# Patient Record
Sex: MALE | Race: WHITE | NOT HISPANIC OR LATINO | URBAN - METROPOLITAN AREA
[De-identification: names, ages, dates, MRNs, and addresses within clinical notes are randomized per-mention and may not be internally consistent; named-entity substitution may affect disease eponyms.]

---

## 2023-02-06 ENCOUNTER — INPATIENT (INPATIENT)
Facility: HOSPITAL | Age: 73
LOS: 2 days | Discharge: HOME | DRG: 299 | End: 2023-02-09
Attending: GENERAL PRACTICE | Admitting: STUDENT IN AN ORGANIZED HEALTH CARE EDUCATION/TRAINING PROGRAM
Payer: MEDICARE

## 2023-02-06 VITALS
SYSTOLIC BLOOD PRESSURE: 148 MMHG | OXYGEN SATURATION: 98 % | HEART RATE: 60 BPM | DIASTOLIC BLOOD PRESSURE: 84 MMHG | TEMPERATURE: 97 F | WEIGHT: 179.9 LBS | RESPIRATION RATE: 16 BRPM

## 2023-02-06 DIAGNOSIS — K08.409 PARTIAL LOSS OF TEETH, UNSPECIFIED CAUSE, UNSPECIFIED CLASS: Chronic | ICD-10-CM

## 2023-02-06 DIAGNOSIS — I26.99 OTHER PULMONARY EMBOLISM WITHOUT ACUTE COR PULMONALE: ICD-10-CM

## 2023-02-06 DIAGNOSIS — Z29.9 ENCOUNTER FOR PROPHYLACTIC MEASURES, UNSPECIFIED: ICD-10-CM

## 2023-02-06 DIAGNOSIS — I82.409 ACUTE EMBOLISM AND THROMBOSIS OF UNSPECIFIED DEEP VEINS OF UNSPECIFIED LOWER EXTREMITY: ICD-10-CM

## 2023-02-06 DIAGNOSIS — K21.9 GASTRO-ESOPHAGEAL REFLUX DISEASE WITHOUT ESOPHAGITIS: ICD-10-CM

## 2023-02-06 DIAGNOSIS — I80.229 PHLEBITIS AND THROMBOPHLEBITIS OF UNSPECIFIED POPLITEAL VEIN: ICD-10-CM

## 2023-02-06 LAB
ALBUMIN SERPL ELPH-MCNC: 3.2 G/DL — LOW (ref 3.3–5)
ALP SERPL-CCNC: 105 U/L — SIGNIFICANT CHANGE UP (ref 40–120)
ALT FLD-CCNC: 35 U/L — SIGNIFICANT CHANGE UP (ref 12–78)
ANION GAP SERPL CALC-SCNC: 6 MMOL/L — SIGNIFICANT CHANGE UP (ref 5–17)
APTT BLD: 32.2 SEC — SIGNIFICANT CHANGE UP (ref 27.5–35.5)
AST SERPL-CCNC: 31 U/L — SIGNIFICANT CHANGE UP (ref 15–37)
BASOPHILS # BLD AUTO: 0.05 K/UL — SIGNIFICANT CHANGE UP (ref 0–0.2)
BASOPHILS NFR BLD AUTO: 1 % — SIGNIFICANT CHANGE UP (ref 0–2)
BILIRUB SERPL-MCNC: 0.6 MG/DL — SIGNIFICANT CHANGE UP (ref 0.2–1.2)
BUN SERPL-MCNC: 15 MG/DL — SIGNIFICANT CHANGE UP (ref 7–23)
CALCIUM SERPL-MCNC: 8.7 MG/DL — SIGNIFICANT CHANGE UP (ref 8.5–10.1)
CHLORIDE SERPL-SCNC: 109 MMOL/L — HIGH (ref 96–108)
CO2 SERPL-SCNC: 26 MMOL/L — SIGNIFICANT CHANGE UP (ref 22–31)
CREAT SERPL-MCNC: 0.92 MG/DL — SIGNIFICANT CHANGE UP (ref 0.5–1.3)
D DIMER BLD IA.RAPID-MCNC: 3234 NG/ML DDU — HIGH
EGFR: 87 ML/MIN/1.73M2 — SIGNIFICANT CHANGE UP
EOSINOPHIL # BLD AUTO: 0.13 K/UL — SIGNIFICANT CHANGE UP (ref 0–0.5)
EOSINOPHIL NFR BLD AUTO: 2.5 % — SIGNIFICANT CHANGE UP (ref 0–6)
FLUAV AG NPH QL: SIGNIFICANT CHANGE UP
FLUBV AG NPH QL: SIGNIFICANT CHANGE UP
GLUCOSE SERPL-MCNC: 87 MG/DL — SIGNIFICANT CHANGE UP (ref 70–99)
HCT VFR BLD CALC: 40.6 % — SIGNIFICANT CHANGE UP (ref 39–50)
HGB BLD-MCNC: 13.4 G/DL — SIGNIFICANT CHANGE UP (ref 13–17)
IMM GRANULOCYTES NFR BLD AUTO: 0 % — SIGNIFICANT CHANGE UP (ref 0–0.9)
INR BLD: 1.03 RATIO — SIGNIFICANT CHANGE UP (ref 0.88–1.16)
LYMPHOCYTES # BLD AUTO: 1.24 K/UL — SIGNIFICANT CHANGE UP (ref 1–3.3)
LYMPHOCYTES # BLD AUTO: 24 % — SIGNIFICANT CHANGE UP (ref 13–44)
MCHC RBC-ENTMCNC: 31.1 PG — SIGNIFICANT CHANGE UP (ref 27–34)
MCHC RBC-ENTMCNC: 33 GM/DL — SIGNIFICANT CHANGE UP (ref 32–36)
MCV RBC AUTO: 94.2 FL — SIGNIFICANT CHANGE UP (ref 80–100)
MONOCYTES # BLD AUTO: 0.39 K/UL — SIGNIFICANT CHANGE UP (ref 0–0.9)
MONOCYTES NFR BLD AUTO: 7.5 % — SIGNIFICANT CHANGE UP (ref 2–14)
NEUTROPHILS # BLD AUTO: 3.36 K/UL — SIGNIFICANT CHANGE UP (ref 1.8–7.4)
NEUTROPHILS NFR BLD AUTO: 65 % — SIGNIFICANT CHANGE UP (ref 43–77)
NRBC # BLD: 0 /100 WBCS — SIGNIFICANT CHANGE UP (ref 0–0)
NT-PROBNP SERPL-SCNC: 186 PG/ML — HIGH (ref 0–125)
PLATELET # BLD AUTO: 232 K/UL — SIGNIFICANT CHANGE UP (ref 150–400)
POTASSIUM SERPL-MCNC: 4.3 MMOL/L — SIGNIFICANT CHANGE UP (ref 3.5–5.3)
POTASSIUM SERPL-SCNC: 4.3 MMOL/L — SIGNIFICANT CHANGE UP (ref 3.5–5.3)
PROT SERPL-MCNC: 6.5 G/DL — SIGNIFICANT CHANGE UP (ref 6–8.3)
PROTHROM AB SERPL-ACNC: 12 SEC — SIGNIFICANT CHANGE UP (ref 10.5–13.4)
RBC # BLD: 4.31 M/UL — SIGNIFICANT CHANGE UP (ref 4.2–5.8)
RBC # FLD: 11.9 % — SIGNIFICANT CHANGE UP (ref 10.3–14.5)
RSV RNA NPH QL NAA+NON-PROBE: SIGNIFICANT CHANGE UP
SARS-COV-2 RNA SPEC QL NAA+PROBE: SIGNIFICANT CHANGE UP
SODIUM SERPL-SCNC: 141 MMOL/L — SIGNIFICANT CHANGE UP (ref 135–145)
TROPONIN I, HIGH SENSITIVITY RESULT: 28.8 NG/L — SIGNIFICANT CHANGE UP
WBC # BLD: 5.17 K/UL — SIGNIFICANT CHANGE UP (ref 3.8–10.5)
WBC # FLD AUTO: 5.17 K/UL — SIGNIFICANT CHANGE UP (ref 3.8–10.5)

## 2023-02-06 PROCEDURE — 93010 ELECTROCARDIOGRAM REPORT: CPT

## 2023-02-06 PROCEDURE — 99285 EMERGENCY DEPT VISIT HI MDM: CPT

## 2023-02-06 PROCEDURE — 99222 1ST HOSP IP/OBS MODERATE 55: CPT | Mod: GC

## 2023-02-06 PROCEDURE — 71275 CT ANGIOGRAPHY CHEST: CPT | Mod: 26,MA

## 2023-02-06 PROCEDURE — 93971 EXTREMITY STUDY: CPT | Mod: 26,LT

## 2023-02-06 RX ORDER — FAMOTIDINE 10 MG/ML
1 INJECTION INTRAVENOUS
Qty: 0 | Refills: 0 | DISCHARGE

## 2023-02-06 RX ORDER — ONDANSETRON 8 MG/1
4 TABLET, FILM COATED ORAL EVERY 8 HOURS
Refills: 0 | Status: DISCONTINUED | OUTPATIENT
Start: 2023-02-06 | End: 2023-02-09

## 2023-02-06 RX ORDER — ACETAMINOPHEN 500 MG
650 TABLET ORAL EVERY 6 HOURS
Refills: 0 | Status: DISCONTINUED | OUTPATIENT
Start: 2023-02-06 | End: 2023-02-09

## 2023-02-06 RX ORDER — HEPARIN SODIUM 5000 [USP'U]/ML
3000 INJECTION INTRAVENOUS; SUBCUTANEOUS EVERY 6 HOURS
Refills: 0 | Status: DISCONTINUED | OUTPATIENT
Start: 2023-02-06 | End: 2023-02-08

## 2023-02-06 RX ORDER — LANOLIN ALCOHOL/MO/W.PET/CERES
3 CREAM (GRAM) TOPICAL AT BEDTIME
Refills: 0 | Status: DISCONTINUED | OUTPATIENT
Start: 2023-02-06 | End: 2023-02-09

## 2023-02-06 RX ORDER — HEPARIN SODIUM 5000 [USP'U]/ML
INJECTION INTRAVENOUS; SUBCUTANEOUS
Qty: 25000 | Refills: 0 | Status: DISCONTINUED | OUTPATIENT
Start: 2023-02-06 | End: 2023-02-08

## 2023-02-06 RX ORDER — HEPARIN SODIUM 5000 [USP'U]/ML
6500 INJECTION INTRAVENOUS; SUBCUTANEOUS ONCE
Refills: 0 | Status: COMPLETED | OUTPATIENT
Start: 2023-02-06 | End: 2023-02-06

## 2023-02-06 RX ORDER — HEPARIN SODIUM 5000 [USP'U]/ML
6500 INJECTION INTRAVENOUS; SUBCUTANEOUS EVERY 6 HOURS
Refills: 0 | Status: DISCONTINUED | OUTPATIENT
Start: 2023-02-06 | End: 2023-02-08

## 2023-02-06 RX ADMIN — HEPARIN SODIUM 1500 UNIT(S)/HR: 5000 INJECTION INTRAVENOUS; SUBCUTANEOUS at 20:35

## 2023-02-06 RX ADMIN — HEPARIN SODIUM 1500 UNIT(S)/HR: 5000 INJECTION INTRAVENOUS; SUBCUTANEOUS at 23:32

## 2023-02-06 RX ADMIN — HEPARIN SODIUM 6500 UNIT(S): 5000 INJECTION INTRAVENOUS; SUBCUTANEOUS at 20:36

## 2023-02-06 NOTE — ED PROVIDER NOTE - PHYSICAL EXAMINATION
Constitutional: Awake, Alert, non-toxic. NAD. Well appearing, well nourished.   HEAD: Normocephalic, atraumatic.   EYES: EOM intact, conjunctiva and sclera are clear bilaterally.   ENT: No rhinorrhea, patent, mucous membranes pink/moist, no drooling or stridor.   NECK: Supple, non-tender  CARDIOVASCULAR: Normal S1, S2; regular rate and rhythm.  RESPIRATORY: Normal respiratory effort; breath sounds CTAB, no wheezes, rhonchi, or rales. Speaking in full sentences. No accessory muscle use.   ABDOMEN: Soft; non-tender, non-distended.   EXTREMITIES: Full passive and active ROM in all extremities; non-tender to palpation; distal pulses palpable and symmetric, (+) mild LLE edema, negative Gabby sign.   SKIN: Warm, dry; good skin turgor, no apparent lesions or rashes, no ecchymosis, brisk capillary refill.  NEURO: A&O x3. Sensory and motor functions are grossly intact. Speech is normal. Appearance and judgement seem appropriate for gender and age.

## 2023-02-06 NOTE — H&P ADULT - HISTORY OF PRESENT ILLNESS
73 yo M presents with LLE ankle swelling x 1 week. Patient noted about 7 days ago while taking a shower, he noted ankle swelling. He called his PCP who recommended to go to an urgent care. The swelling had not progressed. He did not some LLE calf tightness but no swelling or pain. Several days ago he was using a stationary bike and noted SOB at the end of his workout, which is abnormal for him. He was seen by urgent care this morning who recommended LLE ultrasound to r/o clot. As he was driving to [facility], he received a call from his PCP who recommended he go to the ER instead. He otherwise was feeling is normal state of health. Has no prior history of clots, no family history of coagulation disorders, no recent travel.   Patient is an avid runner, and active, previously ran marathons.     ER Course:   Vitals: 148/84 HR 60 RR 16 98% RA   Labs: D dimer: 3234  troponin negative   Imaging:   CTA: Bilateral multifocal pulmonary thromboemboli, lobar and   subsegmental/subsegmental branches. Suspect mild acute right heart strain.  LLE Doppler Ultrasound: Acute left leg deep vein thrombosis involving the distal femoral vein,   popliteal vein and posterior tibial vein. Acute deep venous thrombosis: above and below the knee.  ECG:   Started on heparin gtt    73 yo M presents with LLE ankle swelling x 1 week. Patient noted about 7 days ago while taking a shower, he noted ankle swelling. He called his PCP who recommended to go to an urgent care. The swelling had not progressed. He did not some LLE calf tightness but no swelling or pain. Several days ago he was using a stationary bike and noted SOB at the end of his workout, which is abnormal for him. He was seen by urgent care this morning who recommended LLE ultrasound to r/o clot. As he was driving to [facility], he received a call from his PCP who recommended he go to the ER instead. He otherwise was feeling is normal state of health. Has no prior history of clots, no family history of coagulation disorders, no recent travel.   Patient is an avid runner, and active, previously ran marathons.     ER Course:   Vitals: 148/84 HR 60 RR 16 98% RA   Labs: D dimer: 3234  troponin negative   Imaging:   CTA: Bilateral multifocal pulmonary thromboemboli, lobar and   subsegmental/subsegmental branches. Suspect mild acute right heart strain.  LLE Doppler Ultrasound: Acute left leg deep vein thrombosis involving the distal femoral vein,   popliteal vein and posterior tibial vein. Acute deep venous thrombosis: above and below the knee.  ECG: sinus bradycardia @ 51 bpm   Started on heparin gtt

## 2023-02-06 NOTE — H&P ADULT - PROBLEM SELECTOR PLAN 2
presenting with LLE swelling, found to have above and below the knee DVTs   - no known risk factors; active life style, no a fib, no fhx coagulopathies, BMI wnl; likely unprovoked  - hep gtt  - will likely need hematology work up for coagulopathies, discussed with patient

## 2023-02-06 NOTE — ED ADULT NURSE NOTE - OBJECTIVE STATEMENT
Received pt in bed alert and oriented x4.  C/O left ankle and foot swelling x a couple of weeks. Pt also reports minor swelling to calf.  Pt reports that he noticed swelling, but denies any pain at this time.  Pt denies fevers, sob, chest pain.  Pt called PCP who sent pt to er for eval and rule out DVT.

## 2023-02-06 NOTE — H&P ADULT - NSHPPHYSICALEXAM_GEN_ALL_CORE
T(C): 36.2 (02-06-23 @ 15:04), Max: 36.2 (02-06-23 @ 15:04)  HR: 60 (02-06-23 @ 15:04) (60 - 60)  BP: 148/84 (02-06-23 @ 15:04) (148/84 - 148/84)  RR: 16 (02-06-23 @ 15:04) (16 - 16)  SpO2: 98% (02-06-23 @ 15:04) (98% - 98%)    GENERAL: patient appears well, no acute distress, appropriate, pleasant  EYES: sclera clear, no exudates  ENMT: oropharynx clear without erythema, no exudates, moist mucous membranes  NECK: supple, soft, no thyromegaly noted  LUNGS: good air entry bilaterally, clear to auscultation, symmetric breath sounds, no wheezing or rhonchi appreciated  HEART: soft S1/S2, regular rate and rhythm, no murmurs noted, no lower extremity edema  GASTROINTESTINAL: abdomen is soft, nontender, nondistended, normoactive bowel sounds, no palpable masses  INTEGUMENT: good skin turgor, no lesions noted  MUSCULOSKELETAL: + nonpitting left ankle swelling, no calf tenderness   NEUROLOGIC: awake, alert, oriented x3, good muscle tone in 4 extremities, no obvious sensory deficits  PSYCHIATRIC: mood is good, affect is congruent, linear and logical thought process  HEME/LYMPH: no palpable supraclavicular nodules, no obvious ecchymosis or petechiae

## 2023-02-06 NOTE — H&P ADULT - NSHPREVIEWOFSYSTEMS_GEN_ALL_CORE
Constitutional: denies fever, chills, sweating  HEENT: denies headache, dizziness, or lightheadedness  Respiratory: + SOB, denies cough, or wheezing  Cardiovascular: denies CP, palpitations  Gastrointestinal: denies nausea, vomiting, diarrhea, constipation, abdominal pain, or bloody stools  Genitourinary: denies painful urination, increased frequency, urgency, or bloody urine  Skin/Breast: denies rashes or itching  Musculoskeletal: + LLE swelling. denies muscle aches, joint swelling, or muscle weakness  Neurologic: denies loss of sensation, numbness, or tingling  ROS negative except as noted above

## 2023-02-06 NOTE — H&P ADULT - PROBLEM SELECTOR PLAN 1
CTA revealing bilateral multifocal pulmonary emboli  - likely originated from LLE DVT   - start hep gtt  - evident of right heart strain --> check TTE   - patient currently hemodynamically stable   - h/h wnl, continue to monitor for bleeding  - cards consult CTA revealing bilateral multifocal pulmonary emboli  - likely originated from LLE DVT   - start hep gtt  - evident of right heart strain --> check TTE   - patient currently hemodynamically stable   - h/h wnl, continue to monitor for bleeding  - cards consult [facility], heme consult, [facility] CTA revealing bilateral multifocal pulmonary emboli  - likely originated from LLE DVT   - start hep gtt  - evident of right heart strain --> check TTE   - ekg sinus vika   - patient currently hemodynamically stable   - h/h wnl, continue to monitor for bleeding  - cards consult [facility], heme consult, [facility]

## 2023-02-06 NOTE — ED PROVIDER NOTE - CLINICAL SUMMARY MEDICAL DECISION MAKING FREE TEXT BOX
72-year-old male with no reported past medical history presents today complaining of left lower extremity swelling.  Patient has been experiencing swelling for a week and went to the urgent care today and was given rx for ultrasound to rule out a blood clot.  Patient initially denied shortness of breath, but then reported that he has been noticing with his workouts such as running and bicycling over the last week, he has felt unusually more tired/fatigued.  Patient denies chest pain, hemoptysis, history of blood clot, recent travel, recent surgery, calf pain, blood thinner use, palpitations, syncope, or any other complaints.  denies injury to leg or ankle or pain, denies chest pain or sob  well appearing, slight swelling noted to left ankle, will ro dvt

## 2023-02-06 NOTE — H&P ADULT - ATTENDING COMMENTS
71 yo M admitted for DVT and PE.  start hep drip. heme, cards consult. admit to telemetry. CT with mild acute R heart strain but currently hemodynamically stable. check TTE.

## 2023-02-06 NOTE — ED PROVIDER NOTE - OBJECTIVE STATEMENT
72-year-old male with no reported past medical history presents today complaining of left lower extremity swelling.  Patient has been experiencing swelling for a week and went to the urgent care today in which he was advised to get an ultrasound to rule out a blood clot.  Patient initially denied shortness of breath, but then reported that he has been noticing with his workouts such as running and bicycling over the last week, he has felt unusually more tired/fatigued.  Patient denies chest pain, hemoptysis, history of blood clot, recent travel, recent surgery, calf pain, blood thinner use, palpitations, syncope, or any other complaints.

## 2023-02-06 NOTE — H&P ADULT - NSHPSOCIALHISTORY_GEN_ALL_CORE
lives alone  adls independent  ambulates independent  drugs: no  alcohol: social  smoking: never   covid moderna x x2 no boosters

## 2023-02-07 LAB
ALBUMIN SERPL ELPH-MCNC: 3 G/DL — LOW (ref 3.3–5)
ALP SERPL-CCNC: 91 U/L — SIGNIFICANT CHANGE UP (ref 40–120)
ALT FLD-CCNC: 31 U/L — SIGNIFICANT CHANGE UP (ref 12–78)
ANION GAP SERPL CALC-SCNC: 6 MMOL/L — SIGNIFICANT CHANGE UP (ref 5–17)
APTT BLD: 134.9 SEC — CRITICAL HIGH (ref 27.5–35.5)
APTT BLD: 70.7 SEC — HIGH (ref 27.5–35.5)
APTT BLD: 73.4 SEC — HIGH (ref 27.5–35.5)
AST SERPL-CCNC: 28 U/L — SIGNIFICANT CHANGE UP (ref 15–37)
BILIRUB SERPL-MCNC: 0.6 MG/DL — SIGNIFICANT CHANGE UP (ref 0.2–1.2)
BUN SERPL-MCNC: 15 MG/DL — SIGNIFICANT CHANGE UP (ref 7–23)
CALCIUM SERPL-MCNC: 8.9 MG/DL — SIGNIFICANT CHANGE UP (ref 8.5–10.1)
CHLORIDE SERPL-SCNC: 108 MMOL/L — SIGNIFICANT CHANGE UP (ref 96–108)
CO2 SERPL-SCNC: 28 MMOL/L — SIGNIFICANT CHANGE UP (ref 22–31)
CREAT SERPL-MCNC: 0.9 MG/DL — SIGNIFICANT CHANGE UP (ref 0.5–1.3)
EGFR: 90 ML/MIN/1.73M2 — SIGNIFICANT CHANGE UP
GLUCOSE SERPL-MCNC: 89 MG/DL — SIGNIFICANT CHANGE UP (ref 70–99)
HCT VFR BLD CALC: 38.1 % — LOW (ref 39–50)
HCV AB S/CO SERPL IA: 0.05 S/CO — SIGNIFICANT CHANGE UP (ref 0–0.99)
HCV AB SERPL-IMP: SIGNIFICANT CHANGE UP
HGB BLD-MCNC: 13 G/DL — SIGNIFICANT CHANGE UP (ref 13–17)
MCHC RBC-ENTMCNC: 31.6 PG — SIGNIFICANT CHANGE UP (ref 27–34)
MCHC RBC-ENTMCNC: 34.1 GM/DL — SIGNIFICANT CHANGE UP (ref 32–36)
MCV RBC AUTO: 92.7 FL — SIGNIFICANT CHANGE UP (ref 80–100)
NRBC # BLD: 0 /100 WBCS — SIGNIFICANT CHANGE UP (ref 0–0)
PLATELET # BLD AUTO: 217 K/UL — SIGNIFICANT CHANGE UP (ref 150–400)
POTASSIUM SERPL-MCNC: 4.2 MMOL/L — SIGNIFICANT CHANGE UP (ref 3.5–5.3)
POTASSIUM SERPL-SCNC: 4.2 MMOL/L — SIGNIFICANT CHANGE UP (ref 3.5–5.3)
PROT SERPL-MCNC: 6.1 G/DL — SIGNIFICANT CHANGE UP (ref 6–8.3)
RBC # BLD: 4.11 M/UL — LOW (ref 4.2–5.8)
RBC # FLD: 11.9 % — SIGNIFICANT CHANGE UP (ref 10.3–14.5)
SODIUM SERPL-SCNC: 142 MMOL/L — SIGNIFICANT CHANGE UP (ref 135–145)
WBC # BLD: 4.65 K/UL — SIGNIFICANT CHANGE UP (ref 3.8–10.5)
WBC # FLD AUTO: 4.65 K/UL — SIGNIFICANT CHANGE UP (ref 3.8–10.5)

## 2023-02-07 PROCEDURE — 99222 1ST HOSP IP/OBS MODERATE 55: CPT

## 2023-02-07 PROCEDURE — 99233 SBSQ HOSP IP/OBS HIGH 50: CPT | Mod: GC

## 2023-02-07 PROCEDURE — 93306 TTE W/DOPPLER COMPLETE: CPT | Mod: 26

## 2023-02-07 PROCEDURE — 74177 CT ABD & PELVIS W/CONTRAST: CPT | Mod: 26

## 2023-02-07 RX ADMIN — HEPARIN SODIUM 1200 UNIT(S)/HR: 5000 INJECTION INTRAVENOUS; SUBCUTANEOUS at 19:04

## 2023-02-07 RX ADMIN — HEPARIN SODIUM 1200 UNIT(S)/HR: 5000 INJECTION INTRAVENOUS; SUBCUTANEOUS at 12:25

## 2023-02-07 RX ADMIN — HEPARIN SODIUM 1200 UNIT(S)/HR: 5000 INJECTION INTRAVENOUS; SUBCUTANEOUS at 07:03

## 2023-02-07 RX ADMIN — HEPARIN SODIUM 1200 UNIT(S)/HR: 5000 INJECTION INTRAVENOUS; SUBCUTANEOUS at 19:33

## 2023-02-07 RX ADMIN — HEPARIN SODIUM 1200 UNIT(S)/HR: 5000 INJECTION INTRAVENOUS; SUBCUTANEOUS at 04:58

## 2023-02-07 RX ADMIN — HEPARIN SODIUM 1200 UNIT(S)/HR: 5000 INJECTION INTRAVENOUS; SUBCUTANEOUS at 14:59

## 2023-02-07 RX ADMIN — HEPARIN SODIUM 0 UNIT(S)/HR: 5000 INJECTION INTRAVENOUS; SUBCUTANEOUS at 03:56

## 2023-02-07 NOTE — CARE COORDINATION ASSESSMENT. - ASSESSMENT CONCERNS TO BE ADDRESSED
Patient is aware he will be discharged on a po anticoagulant. Patient stated he will follow up with outpatient providers. Does not need a visiting nurse./no discharge needs identified

## 2023-02-07 NOTE — DISCHARGE NOTE PROVIDER - NSDCMRMEDTOKEN_GEN_ALL_CORE_FT
Pepcid 20 mg oral tablet: 1 tab(s) orally once a day   Eliquis 5 mg oral tablet: Please take 2 tab(s) for a total of 10mg every 12 hours for the first 6 days from discharge  Eliquis 5 mg oral tablet: 1 tab(s) orally every 12 hours   Pepcid 20 mg oral tablet: 1 tab(s) orally once a day

## 2023-02-07 NOTE — CONSULT NOTE ADULT - SUBJECTIVE AND OBJECTIVE BOX
All records reviewed.    HPI:  71 yo man not on prescription meds and has seen PMD recently due to COVID pandemic.   Presented w 4-5 day hx left foot mild edema slow progressed to left calf, not assoc w pain or color change. Also in retrospect MAGANA /less exeercise tolerance x 1wk, seen at Urgent Care told possible DVT, and also instructed by PMD to go to ER    Duplex w left leg DVT distal femoral, pop, post tibial  CTA chest yao multifocal PE, suspect mild right heart strain  Started on Heparin, left foot swelling decreased, no SOB CP    No recent prolonged immobility, not on androgen  No family hx thromboembolism      ER Course:   Vitals: 148/84 HR 60 RR 16 98% RA   Labs: D dimer: 3234  troponin negative   Imaging:   CTA: Bilateral multifocal pulmonary thromboemboli, lobar and   subsegmental/subsegmental branches. Suspect mild acute right heart strain.  LLE Doppler Ultrasound: Acute left leg deep vein thrombosis involving the distal femoral vein,   popliteal vein and posterior tibial vein. Acute deep venous thrombosis: above and below the knee.  ECG: sinus bradycardia @ 51 bpm     PAST MEDICAL & SURGICAL HISTORY:  No pertinent past medical history      [place] teeth extracted          Review of System:  see above  no weight loss fever anorexia sweats diarreha constipation    MEDICATIONS  (STANDING):  heparin  Infusion.  Unit(s)/Hr (15 mL/Hr) IV Continuous <Continuous>    MEDICATIONS  (PRN):  acetaminophen     Tablet .. 650 milliGRAM(s) Oral every 6 hours PRN Temp greater or equal to 38C (100.4F), Mild Pain (1 - 3)  aluminum hydroxide/magnesium hydroxide/simethicone Suspension 30 milliLiter(s) Oral every 4 hours PRN Dyspepsia  heparin   Injectable 6500 Unit(s) IV Push every 6 hours PRN For aPTT less than 40  heparin   Injectable 3000 Unit(s) IV Push every 6 hours PRN For aPTT between 40 - 57  melatonin 3 milliGRAM(s) Oral at bedtime PRN Insomnia  ondansetron Injectable 4 milliGRAM(s) IV Push every 8 hours PRN Nausea and/or Vomiting      Allergies    No Known Allergies    Intolerances        SOCIAL HISTORY:  smokes marijuana  single no children    FAMILY HISTORY:  FH: type 2 diabetes (Mother)        Vital Signs Last 24 Hrs  T(C): 36.8 (07 Feb 2023 05:16), Max: 37 (06 Feb 2023 22:53)  T(F): 98.2 (07 Feb 2023 05:16), Max: 98.6 (06 Feb 2023 22:53)  HR: 46 (07 Feb 2023 05:16) (46 - 60)  BP: 125/75 (07 Feb 2023 05:16) (125/75 - 159/78)  BP(mean): --  RR: 17 (07 Feb 2023 05:16) (14 - 18)  SpO2: 94% (07 Feb 2023 05:16) (94% - 98%)    Parameters below as of 07 Feb 2023 05:16  Patient On (Oxygen Delivery Method): room air        PHYSICAL EXAM:      General:, in no acute distress  Eyes:sclera anicteric, pupils equal and EOMI  ENMT:buccal mucosa moist  Neck:supple, trachea midline  Lungs:clear, no wheeze/rhonchi  Cardiovascular:regular rate and rhythm, S1 S2  Abdomen:soft, nontender, no organomegaly present, bowel sounds normal  Neurological:alert and oriented x3, Cranial Nerves II-XII grossly intact  Skin:no increased ecchymosis/petechiae/purpura  Lymph Nodes:no palpable cervical/supraclavicular lymph nodes enlargements  Extremities:no cyanosis/clubbing, trace edema left ankle        LABS:                        13.0   4.65  )-----------( 217      ( 07 Feb 2023 02:34 )             38.1     02-07 @ 02:34  WBC4.65  RBC4.11 Hgb13.0 Hct38.1  MCV92.7  Cwck134  Auto Neutro-- Band-- Auto Lymph-- Atypical Lymph-- Reactive Lymph-- Auto Mono-- Auto Eos-- Auto Baso--        02-06 @ 16:30  WBC5.17  RBC4.31 Hgb13.4 Hct40.6  MCV94.2  [id number]  Auto Zrhvlx86.0 Band-- Auto Lymph24.0 Atypical Lymph-- Reactive Lymph-- Auto Mono7.5 Auto Eos2.5 Auto Baso1.0          02-07    142  |  108  |  15  ----------------------------<  89  4.2   |  28  |  0.90    Ca    8.9      07 Feb 2023 06:29    TPro  6.1  /  Alb  3.0<L>  /  TBili  0.6  /  DBili  x   /  AST  28  /  ALT  31  /  AlkPhos  91  02-07    02-07 @ 02:34  PT-- INR--  [id number]  02-06 @ 16:30  PT12.0 INR1.03  PTT32.2        PERIPHERAL BLOOD SMEAR REVIEW:      RADIOLOGY & ADDITIONAL STUDIES:  < from: CT Angio Chest PE Protocol w/ IV Cont (02.06.23 @ 19:09) >    ACC: [id number] EXAM:  CT ANGIO CHEST PULM ART WAWIC   ORDERED BY:   SEDRIKC KRISHNA     PROCEDURE DATE:  02/06/2023          INTERPRETATION:  CLINICAL INFORMATION: Shortness of breath. Edema.    COMPARISON: None.    CONTRAST/COMPLICATIONS:  IVContrast: Omnipaque 350  66 cc administered   34 cc discarded  Oral Contrast: NONE  Complications: None reported at time of study completion    PROCEDURE:  CT Angiography of the Chest.  Sagittal and coronal reformats were performed as well as 3D (MIP)   reconstructions.    FINDINGS:    LUNGS AND AIRWAYS: Patent central airways.  Mild dependent atelectasis.   No consolidation or infarct. Azygous lobe anatomical variant.  PLEURA: No pleural effusion.  MEDIASTINUM AND LUCIA: No lymphadenopathy.  VESSELS: Multifocal bilateral pulmonary thromboemboli, right upper lobar   bifurcation, right interlobar pulmonary artery, left upper and lower lobe   lobar bifurcation, and also scattered segmental/subsegmental branches   bilaterally.  HEART: Heart size is normal. Flattened interventricular septum without   bowing. Minimal reflux of contrast into suprahepatic IVC. Right   ventricle/left ventricle ratio 1.2. No pericardial effusion.  CHEST WALL AND LOWER NECK: Within normal limits.  VISUALIZED UPPER ABDOMEN: Within normal limits.  BONES: Degenerative changes.    IMPRESSION:  Bilateral multifocal pulmonary thromboemboli, lobar and   subsegmental/subsegmental branches. Suspect mild acute right heart strain.    Findings were discussed with Dr. SEDRICK KRISHNA [phone]   2/6/2023 7:30 PM by Dr. Oliver with read back confirmation.      < end of copied text >  < from: [facility] Duplex Lower Ext Vein Scan, Left (02.06.23 @ 16:30) >  ACC: [id number] EXAM:  DUPLEX EXT VEINS LOWER LT   ORDERED BY: SEDRICK KRISHNA     PROCEDURE DATE:  02/06/2023          INTERPRETATION:  CLINICAL INFORMATION: Left leg swelling.    COMPARISON: None available.    TECHNIQUE: Duplex sonography of the LEFT LOWER extremity veins with color   and spectral Doppler, with and without compression.    FINDINGS:    The left common femoral vein and proximal and mid femoral veins are   unremarkable.    There is deep vein thrombosis in the distal left femoral vein and   popliteal vein extending to the left posterior tibial vein.    Limited visualization of the calf veins.    IMPRESSION:  Acute left leg deep vein thrombosis involving the distal femoral vein,   popliteal vein and posterior tibial vein.  Acute deep venous thrombosis: above and below the knee.    Findings were discussed with Dr. SEDRICK KRISHNA [phone]   2/6/2023 4:48 PM by Dr. Louis Ramos with read back confirmation.      < end of copied text >   All records reviewed.    HPI:  71 yo man not on prescription meds and has seen PMD recently due to COVID pandemic.   Presented w 4-5 day hx left foot mild edema slow progressed to left calf, not assoc w pain or color change. Also in retrospect MAGANA /less exeercise tolerance x 1wk, seen at Urgent Care told possible DVT, and also instructed by PMD to go to ER    Duplex w left leg DVT distal femoral, pop, post tibial  CTA chest yao multifocal PE, suspect mild right heart strain  Started on Heparin, left foot swelling decreased, no SOB CP    No recent prolonged immobility, not on androgen  No family hx thromboembolism      ER Course:   Vitals: 148/84 HR 60 RR 16 98% RA   Labs: D dimer: 3234  troponin negative   Imaging:   CTA: Bilateral multifocal pulmonary thromboemboli, lobar and   subsegmental/subsegmental branches. Suspect mild acute right heart strain.  LLE Doppler Ultrasound: Acute left leg deep vein thrombosis involving the distal femoral vein,   popliteal vein and posterior tibial vein. Acute deep venous thrombosis: above and below the knee.  ECG: sinus bradycardia @ 51 bpm     PAST MEDICAL & SURGICAL HISTORY:  No pertinent past medical history      [place] teeth extracted          Review of System:  see above  no weight loss fever anorexia sweats diarreha constipation    MEDICATIONS  (STANDING):  heparin  Infusion.  Unit(s)/Hr (15 mL/Hr) IV Continuous <Continuous>    MEDICATIONS  (PRN):  acetaminophen     Tablet .. 650 milliGRAM(s) Oral every 6 hours PRN Temp greater or equal to 38C (100.4F), Mild Pain (1 - 3)  aluminum hydroxide/magnesium hydroxide/simethicone Suspension 30 milliLiter(s) Oral every 4 hours PRN Dyspepsia  heparin   Injectable 6500 Unit(s) IV Push every 6 hours PRN For aPTT less than 40  heparin   Injectable 3000 Unit(s) IV Push every 6 hours PRN For aPTT between 40 - 57  melatonin 3 milliGRAM(s) Oral at bedtime PRN Insomnia  ondansetron Injectable 4 milliGRAM(s) IV Push every 8 hours PRN Nausea and/or Vomiting      Allergies    No Known Allergies    Intolerances        SOCIAL HISTORY:  never tobacco  single no children    FAMILY HISTORY:  FH: type 2 diabetes (Mother)        Vital Signs Last 24 Hrs  T(C): 36.8 (07 Feb 2023 05:16), Max: 37 (06 Feb 2023 22:53)  T(F): 98.2 (07 Feb 2023 05:16), Max: 98.6 (06 Feb 2023 22:53)  HR: 46 (07 Feb 2023 05:16) (46 - 60)  BP: 125/75 (07 Feb 2023 05:16) (125/75 - 159/78)  BP(mean): --  RR: 17 (07 Feb 2023 05:16) (14 - 18)  SpO2: 94% (07 Feb 2023 05:16) (94% - 98%)    Parameters below as of 07 Feb 2023 05:16  Patient On (Oxygen Delivery Method): room air        PHYSICAL EXAM:      General:, in no acute distress  Eyes:sclera anicteric, pupils equal and EOMI  ENMT:buccal mucosa moist  Neck:supple, trachea midline  Lungs:clear, no wheeze/rhonchi  Cardiovascular:regular rate and rhythm, S1 S2  Abdomen:soft, nontender, no organomegaly present, bowel sounds normal  Neurological:alert and oriented x3, Cranial Nerves II-XII grossly intact  Skin:no increased ecchymosis/petechiae/purpura  Lymph Nodes:no palpable cervical/supraclavicular lymph nodes enlargements  Extremities:no cyanosis/clubbing, trace edema left ankle        LABS:                        13.0   4.65  )-----------( 217      ( 07 Feb 2023 02:34 )             38.1     02-07 @ 02:34  WBC4.65  RBC4.11 Hgb13.0 Hct38.1  MCV92.7  [id number]  Auto Neutro-- Band-- Auto Lymph-- Atypical Lymph-- Reactive Lymph-- Auto Mono-- Auto Eos-- Auto Baso--        02-06 @ 16:30  WBC5.17  RBC4.31 Hgb13.4 Hct40.6  MCV94.2  [id number]  Auto Nzgqcf89.0 Band-- Auto Lymph24.0 Atypical Lymph-- Reactive Lymph-- Auto Mono7.5 Auto Eos2.5 Auto Baso1.0          02-07    142  |  108  |  15  ----------------------------<  89  4.2   |  28  |  0.90    Ca    8.9      07 Feb 2023 06:29    TPro  6.1  /  Alb  3.0<L>  /  TBili  0.6  /  DBili  x   /  AST  28  /  ALT  31  /  AlkPhos  91  02-07    02-07 @ 02:34  PT-- INR--  [id number]  02-06 @ 16:30  PT12.0 INR1.03  PTT32.2        PERIPHERAL BLOOD SMEAR REVIEW:      RADIOLOGY & ADDITIONAL STUDIES:  < from: CT Angio Chest PE Protocol w/ IV Cont (02.06.23 @ 19:09) >    ACC: [id number] EXAM:  CT ANGIO CHEST PULM ART WAWIC   ORDERED BY:   SEDRICK KRISHNA     PROCEDURE DATE:  02/06/2023          INTERPRETATION:  CLINICAL INFORMATION: Shortness of breath. Edema.    COMPARISON: None.    CONTRAST/COMPLICATIONS:  IVContrast: Omnipaque 350  66 cc administered   34 cc discarded  Oral Contrast: NONE  Complications: None reported at time of study completion    PROCEDURE:  CT Angiography of the Chest.  Sagittal and coronal reformats were performed as well as 3D (MIP)   reconstructions.    FINDINGS:    LUNGS AND AIRWAYS: Patent central airways.  Mild dependent atelectasis.   No consolidation or infarct. Azygous lobe anatomical variant.  PLEURA: No pleural effusion.  MEDIASTINUM AND LUCIA: No lymphadenopathy.  VESSELS: Multifocal bilateral pulmonary thromboemboli, right upper lobar   bifurcation, right interlobar pulmonary artery, left upper and lower lobe   lobar bifurcation, and also scattered segmental/subsegmental branches   bilaterally.  HEART: Heart size is normal. Flattened interventricular septum without   bowing. Minimal reflux of contrast into suprahepatic IVC. Right   ventricle/left ventricle ratio 1.2. No pericardial effusion.  CHEST WALL AND LOWER NECK: Within normal limits.  VISUALIZED UPPER ABDOMEN: Within normal limits.  BONES: Degenerative changes.    IMPRESSION:  Bilateral multifocal pulmonary thromboemboli, lobar and   subsegmental/subsegmental branches. Suspect mild acute right heart strain.    Findings were discussed with Dr. SEDRICK KRISHNA [phone]   2/6/2023 7:30 PM by Dr. Oliver with read back confirmation.      < end of copied text >  < from: [facility] Duplex Lower Ext Vein Scan, Left (02.06.23 @ 16:30) >  ACC: [id number] EXAM:  DUPLEX EXT VEINS LOWER LT   ORDERED BY: SEDRICK KRISHNA     PROCEDURE DATE:  02/06/2023          INTERPRETATION:  CLINICAL INFORMATION: Left leg swelling.    COMPARISON: None available.    TECHNIQUE: Duplex sonography of the LEFT LOWER extremity veins with color   and spectral Doppler, with and without compression.    FINDINGS:    The left common femoral vein and proximal and mid femoral veins are   unremarkable.    There is deep vein thrombosis in the distal left femoral vein and   popliteal vein extending to the left posterior tibial vein.    Limited visualization of the calf veins.    IMPRESSION:  Acute left leg deep vein thrombosis involving the distal femoral vein,   popliteal vein and posterior tibial vein.  Acute deep venous thrombosis: above and below the knee.    Findings were discussed with Dr. SEDRICK KRISHNA [id number]   2/6/2023 4:48 PM by Dr. Louis Ramos with read back confirmation.      < end of copied text >

## 2023-02-07 NOTE — CARE COORDINATION ASSESSMENT. - NSCAREPROVIDERS_GEN_ALL_CORE_FT
CARE PROVIDERS:  Accepting Physician: Tyrell Brito  Admitting: Tyrell Brito  Attending: Tyrell Brito  Cardiology Technician: Jessenia Vu  Case Management: Cleopatra Rooney  Case Management: Mell Ospina  Consultant: Mag Harmon  Covering Team: Bernabe Yao  Covering Team: Sivan Harry  ED ACP: Etienne Montiel  ED Attending: Linda Dawson ED Nurse: Moses Alonzo  Emergency Medicine: Etienne Montiel  Nurse: Keven Elkins  Nurse: Tabitha Mills  Nurse: Rody Cole  Nurse: Moses Alonzo  Ordered: ADM, User  Override: Viola Pandey  PCA/Nursing Assistant: Nga Monteiro  PCA/Nursing Assistant: Chari Osuna  Primary Team: Johanne Jennings  Primary Team: Arvin Washburn  Primary Team: Francis Dillard  Primary Team: Wei Carl  Primary Team: Tyrell Brito  Primary Team: Tee Mendoza  Registered Dietitian: Sigrid Dahl  Student: Adeladia Dimas

## 2023-02-07 NOTE — CARE COORDINATION ASSESSMENT. - OTHER PERTINENT DISCHARGE PLANNING INFORMATION:
CM met with the patient at the bedside and explained role of CM and transition planning. Patient verbalized understanding. Patient lives alone in a private home. Independent with ADL's/ambulation/driving PTA. Patient denies home care services PTA. Patient was not taking medications PTA. CM provided direct contact information/resource folder and remains available.

## 2023-02-07 NOTE — DISCHARGE NOTE PROVIDER - CARE PROVIDER_API CALL
Mag Harmon)  Medical Oncology  [address], [address]  [place] [address]  Phone: [phone]  Fax: [phone]  Follow Up Time: 1 week    CHRISTEL GERMAIN  [facility] Medicine  [facility],    Phone: ()-  Fax: ()-  Established Patient  Follow Up Time: 1 week   Mag Harmon)  Medical Oncology  [address], [address]  [place] [address]  Phone: [phone]  Fax: [phone]  Follow Up Time: 2 weeks    CHRISTEL GERMAIN  [facility]  [facility],    Phone: ()-  Fax: ()-  Established Patient  Follow Up Time:    Mag Harmon (MD)  Medical Oncology  [address], [address]  [place] [address]  Phone: [phone]  Fax: [phone]  Follow Up Time: 2 weeks    CHRISTEL GERMAIN  [facility] Medicine  [facility] DO CHRISTEL,    Phone: ()-  Fax: ()-  Established Patient  Follow Up Time:     Callum Magallon (DO)  Cardiology; Internal Medicine; Nuclear Cardiology  [facility], [address] [place] [address], [address]  [place] [address]  Phone: [phone]  Fax: [phone]  Follow Up Time: 2 weeks

## 2023-02-07 NOTE — PROGRESS NOTE ADULT - ATTENDING COMMENTS
73 yo M presents with LLE ankle swelling x 1 week admitted for acute bilateral pulmonary emboli.    Patient seen and examined at bedside. Patient states he feels well, denies any chest pain, SOB, abd pain. States swelling in his L ankle also much improved. CTA with B/L PE and concern for mild R heart strain, F/U TTE, Vascular and Cardio consulted. Check CT abd/pelvis venogram. Continue heparin gtt for AC, likely switch to PO eliquis if no vascular intervention planned. Heme/Onc consulted, patient will require lifelong AC.

## 2023-02-07 NOTE — PATIENT PROFILE ADULT - NSPROGENPREVTRANSF_GEN_A_NUR
[Irregular Periods] : irregular periods [FreeTextEntry2] : Tiffany is a 14 year 4 month old female referred by PCP Dr. Smyth due to concern re: abnormal thyroid levels. Mother reports that patient had blood work done at the end of December 2021 due to frequent nausea in the morning times x past 5 month. Mother attributed Tiffany's symptoms to older brother's marijuana usage in the house. \par \par In the past week her symptoms got worse and she started having constant nausea, associated with vomiting 3-4 times and poor PO intake. Patient has taken Pepto Bismol and Dramamine for symptoms with no relief. She denied vision changes, weight loss, severe headaches, abdominal pain, hair loss and dry skin. Patient also reports cold intolerance. Tiffany was seen by PMD 2 day after onset of symptoms and tested for COVID and flu, which were negative. \par \par Patient also reports fatigue. She goes to sleep at 12am and wakes up at 6am. Only gets about 6 hours of sleep, no waking up in the middle of night. She is snoring at night. \par \par Patient reports irregular periods. Onset of periods at 9 years old. LMP 2/1/22, 12/31/21, 12/4/21, 10/15/21, 8/29/21. Patient reports that her periods are painful the first 2 days and last 2 days and it causes her to lose a day of school every month when she has her period due to pain. \par \par Lab work revealed elevated LDL Cholesterol. Patient admits large amount of red meat in her diet likely causing hypercholesterolemia. Also, she eats a lot of bread.  [FreeTextEntry1] : Menarche at 9 years 11 month; LMP 2/1/22 no

## 2023-02-07 NOTE — PATIENT PROFILE ADULT - FALL HARM RISK - HARM RISK INTERVENTIONS

## 2023-02-07 NOTE — DISCHARGE NOTE PROVIDER - PROVIDER TOKENS
PROVIDER:[TOKEN:[[id number]],FOLLOWUP:[1 week]],PROVIDER:[TOKEN:[[id number]],FOLLOWUP:[1 week],ESTABLISHEDPATIENT:[T]] PROVIDER:[TOKEN:[[id number]],FOLLOWUP:[2 weeks]],PROVIDER:[TOKEN:[[id number]],ESTABLISHEDPATIENT:[T]] PROVIDER:[TOKEN:[[id number]],FOLLOWUP:[2 weeks]],PROVIDER:[TOKEN:[[id number]],ESTABLISHEDPATIENT:[T]],PROVIDER:[TOKEN:[[id number]],FOLLOWUP:[2 weeks]]

## 2023-02-07 NOTE — CONSULT NOTE ADULT - ATTENDING COMMENTS
pe with equivocal right heart strain by ct  echo prelim with normal ef and no right heart pathology  ac

## 2023-02-07 NOTE — CARE COORDINATION ASSESSMENT. - NSDCPLANSERVICES_GEN_ALL_CORE
Per MD, pending Echo to determine plan of care.     If stable for transition home, patient stated he will follow up with his outpatient providers./No Anticipated Discharge Needs

## 2023-02-07 NOTE — CONSULT NOTE ADULT - SUBJECTIVE AND OBJECTIVE BOX
Vascular Attending:        HPI:  73 yo M presents with LLE ankle swelling x 1 week. Patient noted about 7 days ago while taking a shower, he noted ankle swelling. He called his PCP who recommended to go to an urgent care. The swelling had not progressed. He did not some LLE calf tightness but no swelling or pain. Several days ago he was using a stationary bike and noted SOB at the end of his workout, which is abnormal for him. He was seen by urgent care this morning who recommended LLE ultrasound to r/o clot. As he was driving to [facility], he received a call from his PCP who recommended he go to the ER instead. He otherwise was feeling is normal state of health. Has no prior history of clots, no family history of coagulation disorders, no recent travel.   Patient is an avid runner, and active, previously ran marathons.   Pt walks/runs 4 miles daily and bikes 1700 miles per year. No h/o DVT/PE, varicose veins or family hx of either      ER Course:   Vitals: 148/84 HR 60 RR 16 98% RA   Labs: D dimer: 3234  troponin negative   Imaging:   CTA: Bilateral multifocal pulmonary thromboemboli, lobar and   subsegmental/subsegmental branches. Suspect mild acute right heart strain.  LLE Doppler Ultrasound: Acute left leg deep vein thrombosis involving the distal femoral vein,   popliteal vein and posterior tibial vein. Acute deep venous thrombosis: above and below the knee.  ECG: sinus bradycardia @ 51 bpm   Started on heparin gtt    (06 Feb 2023 20:32)      PAST MEDICAL & SURGICAL HISTORY:  No pertinent past medical history  [place] teeth extracted          REVIEW OF SYSTEMS-as above, otherwise negative      MEDICATIONS  (STANDING):  heparin  Infusion.  Unit(s)/Hr (15 mL/Hr) IV Continuous <Continuous>    MEDICATIONS  (PRN):  acetaminophen     Tablet .. 650 milliGRAM(s) Oral every 6 hours PRN Temp greater or equal to 38C (100.4F), Mild Pain (1 - 3)  aluminum hydroxide/magnesium hydroxide/simethicone Suspension 30 milliLiter(s) Oral every 4 hours PRN Dyspepsia  heparin   Injectable 6500 Unit(s) IV Push every 6 hours PRN For aPTT less than 40  heparin   Injectable 3000 Unit(s) IV Push every 6 hours PRN For aPTT between 40 - 57  melatonin 3 milliGRAM(s) Oral at bedtime PRN Insomnia  ondansetron Injectable 4 milliGRAM(s) IV Push every 8 hours PRN Nausea and/or Vomiting      Allergies  No Known Allergies    SOCIAL HISTORY: Lives alone. Retired . Never smoker. No ETOH or illicit drug      Vital Signs Last 24 Hrs  T(C): 36.6 (07 Feb 2023 12:26), Max: 37 (06 Feb 2023 22:53)  T(F): 97.9 (07 Feb 2023 12:26), Max: 98.6 (06 Feb 2023 22:53)  HR: 58 (07 Feb 2023 12:26) (46 - 60)  BP: 123/72 (07 Feb 2023 12:26) (123/72 - 159/78)  BP(mean): --  RR: 18 (07 Feb 2023 12:26) (14 - 18)  SpO2: 96% (07 Feb 2023 12:26) (94% - 98%)    Parameters below as of 07 Feb 2023 12:26  Patient On (Oxygen Delivery Method): room air        PHYSICAL EXAM:  Constitutional: WD, WN M in NAD  Eyes: PERRL  ENMT: WNL  Neck: No JVD  Respiratory: CTA  Cardiovascular: normal S1, S2  Gastrointestinal: soft, ND, NT, no pulsatile masses  Extremities: Trace LLE edema without discoloration or skin breakdown; neg Gabby's  Neurological: A&O x3, JULIEN X 4 =  Pulses:   Right:                                                                          Left:  FEM [x ]2+ [ ]1+ [ ]doppler                                             FEM [x ]2+ [ ]1+ [ ]doppler    POP [x ]2+ [ ]1+ [ ]doppler                                             POP [x ]2+ [ ]1+ [ ]doppler    DP [x ]2+ [ ]1+ [ ]doppler                                                DP [x ]2+ [ ]1+ [ ]doppler  PT[x ]2+ [ ]1+ [ ]doppler                                                  PT [x ]2+ [ ]1+ [ ]doppler      LABS:                        13.0   4.65  )-----------( 217      ( 07 Feb 2023 02:34 )             38.1     02-07    142  |  108  |  15  ----------------------------<  89  4.2   |  28  |  0.90    Ca    8.9      07 Feb 2023 06:29    TPro  6.1  /  Alb  3.0<L>  /  TBili  0.6  /  DBili  x   /  AST  28  /  ALT  31  /  AlkPhos  91  02-07    PT/INR - ( 06 Feb 2023 16:30 )   PT: 12.0 sec;   INR: 1.03 ratio    PTT - ( 07 Feb 2023 11:07 )  PTT:70.7 sec      RADIOLOGY & ADDITIONAL STUDIES    < from: VA Duplex Lower Ext Vein Scan, Left (02.06.23 @ 16:30) >  ACC: [id number] EXAM:  DUPLEX EXT VEINS LOWER LT   ORDERED BY: SEDRICK KRISHNA     PROCEDURE DATE:  02/06/2023          INTERPRETATION:  CLINICAL INFORMATION: Left leg swelling.    COMPARISON: None available.    TECHNIQUE: Duplex sonography of the LEFT LOWER extremity veins with color   and spectral Doppler, with and without compression.    FINDINGS:    The left common femoral vein and proximal and mid femoral veins are   unremarkable.    There is deep vein thrombosis in the distal left femoral vein and   popliteal vein extending to the left posterior tibial vein.    Limited visualization of the calf veins.    IMPRESSION:  Acute left leg deep vein thrombosis involving the distal femoral vein,   popliteal vein and posterior tibial vein.  Acute deep venous thrombosis: above and below the knee.    Findings were discussed with Dr. SEDRICK KRISHNA [id number]   2/6/2023 4:48 PM by Dr. Louis Ramos with read back confirmation.      < end of copied text >  < from: CT Angio Chest PE Protocol w/ IV Cont (02.06.23 @ 19:09) >  ACC: [id number] EXAM:  CT ANGIO CHEST PULM ART WAWIC   ORDERED BY:   SEDRICK KRISHNA     PROCEDURE DATE:  02/06/2023          INTERPRETATION:  CLINICAL INFORMATION: Shortness of breath. Edema.    COMPARISON: None.    CONTRAST/COMPLICATIONS:  IVContrast: Omnipaque 350  66 cc administered   34 cc discarded  Oral Contrast: NONE  Complications: None reported at time of study completion    PROCEDURE:  CT Angiography of the Chest.  Sagittal and coronal reformats were performed as well as 3D (MIP)   reconstructions.    FINDINGS:    LUNGS AND AIRWAYS: Patent central airways.  Mild dependent atelectasis.   No consolidation or infarct. Azygous lobe anatomical variant.  PLEURA: No pleural effusion.  MEDIASTINUM AND LUCIA: No lymphadenopathy.  VESSELS: Multifocal bilateral pulmonary thromboemboli, right upper lobar   bifurcation, right interlobar pulmonary artery, left upper and lower lobe   lobar bifurcation, and also scattered segmental/subsegmental branches   bilaterally.  HEART: Heart size is normal. Flattened interventricular septum without   bowing. Minimal reflux of contrast into suprahepatic IVC. Right   ventricle/left ventricle ratio 1.2. No pericardial effusion.  CHEST WALL AND LOWER NECK: Within normal limits.  VISUALIZED UPPER ABDOMEN: Within normal limits.  BONES: Degenerative changes.    IMPRESSION:  Bilateral multifocal pulmonary thromboemboli, lobar and   subsegmental/subsegmental branches. Suspect mild acute right heart strain.    Findings were discussed with Dr. SEDRICK KRISHNA [phone]   2/6/2023 7:30 PM by Dr. Oliver with read back confirmation.    < end of copied text >

## 2023-02-07 NOTE — DISCHARGE NOTE PROVIDER - HOSPITAL COURSE
(FROM ADMISSION H+P)  HPI:  73 yo M presents with LLE ankle swelling x 1 week. Patient noted about 7 days ago while taking a shower, he noted ankle swelling. He called his PCP who recommended to go to an urgent care. The swelling had not progressed. He did not some LLE calf tightness but no swelling or pain. Several days ago he was using a stationary bike and noted SOB at the end of his workout, which is abnormal for him. He was seen by urgent care this morning who recommended LLE ultrasound to r/o clot. As he was driving to [facility], he received a call from his PCP who recommended he go to the ER instead. He otherwise was feeling is normal state of health. Has no prior history of clots, no family history of coagulation disorders, no recent travel.   Patient is an avid runner, and active, previously ran marathons.     ER Course:   Vitals: 148/84 HR 60 RR 16 98% RA   Labs: D dimer: 3234  troponin negative   Imaging:   CTA: Bilateral multifocal pulmonary thromboemboli, lobar and   subsegmental/subsegmental branches. Suspect mild acute right heart strain.  LLE Doppler Ultrasound: Acute left leg deep vein thrombosis involving the distal femoral vein,   popliteal vein and posterior tibial vein. Acute deep venous thrombosis: above and below the knee.  ECG: sinus bradycardia @ 51 bpm   Started on heparin gtt    (06 Feb 2023 20:32)      ---  HOSPITAL COURSE:   Patient admitted with bilateral multifocal pulmonary embolisms and multiple DVTs above and below the knee in the LLE. Appears to be unprovoked as patient has no family hx of hypercoagulabilities and is active without any associated comorbidities. Heme, Vascular, and Cardio were consulted with noted possible mild right heart strain. Heparin drip was started and patient was transitioned to oral _______, which will need to be continued lifelong and followed up with Heme outpatient. CTA venogram was performed, which showed no evidence of compression or thrombosis involving the left common iliac or external iliac veins.    Patient's medical condition improved throughout hospital course and is medically stable for discharge home with close outpatient follow up. Patient seen and examined on day of discharge.    ---  PATIENT CONDITION:  - stable    ---  PHYSICAL EXAM (On Date of Discharge):   ---VITALS---    GENERAL: NAD, appears stated age  HEENT: anicteric, eomi  CHEST/LUNG: CTA b/l, no rales, wheezes, or rhonchi  HEART: RRR, S1, S2  ABDOMEN: BS+, soft, nontender, nondistended  EXTREMITIES: no edema, cyanosis, or calf tenderness  NERVOUS SYSTEM: answers questions and follows commands appropriately    ---  CONSULTANTS:   Heme/Onc - Dr. Harmon  Vascular - Dr. Edwards  Cardio - Dr. Linda    ---  TIME SPENT:  I, the attending physician, was physically present for the key portions of the evaluation and management (E/M) service provided. The total amount of time spent reviewing the hospital notes, laboratory values, imaging findings, assessing/counseling the patient, discussing with consultant physicians, social work, nursing staff was -- minutes    ---  Primary care provider was made aware of plan for discharge:  [  ] NO     [  ] YES   (FROM ADMISSION H+P)  HPI:  73 yo M presents with LLE ankle swelling x 1 week. Patient noted about 7 days ago while taking a shower, he noted ankle swelling. He called his PCP who recommended to go to an urgent care. The swelling had not progressed. He did not some LLE calf tightness but no swelling or pain. Several days ago he was using a stationary bike and noted SOB at the end of his workout, which is abnormal for him. He was seen by urgent care this morning who recommended LLE ultrasound to r/o clot. As he was driving to [facility], he received a call from his PCP who recommended he go to the ER instead. He otherwise was feeling is normal state of health. Has no prior history of clots, no family history of coagulation disorders, no recent travel.   Patient is an avid runner, and active, previously ran marathons.     ER Course:   Vitals: 148/84 HR 60 RR 16 98% RA   Labs: D dimer: 3234  troponin negative   Imaging:   CTA: Bilateral multifocal pulmonary thromboemboli, lobar and   subsegmental/subsegmental branches. Suspect mild acute right heart strain.  LLE Doppler Ultrasound: Acute left leg deep vein thrombosis involving the distal femoral vein,   popliteal vein and posterior tibial vein. Acute deep venous thrombosis: above and below the knee.  ECG: sinus bradycardia @ 51 bpm   Started on heparin gtt    (06 Feb 2023 20:32)      ---  HOSPITAL COURSE:   Patient admitted with bilateral multifocal pulmonary embolisms and multiple DVTs above and below the knee in the LLE. Appears to be unprovoked as patient has no family hx of hypercoagulabilities and is active without any associated comorbidities. Heme, Vascular, and Cardio were consulted with noted possible mild right heart strain. Heparin drip was started and patient was transitioned to oral Eliquis, which will need to be continued lifelong and followed up with Heme outpatient. CTA venogram was performed, which showed no evidence of compression or thrombosis involving the left common iliac or external iliac veins. TTE was performed, which showed ____.    Patient's medical condition improved throughout hospital course and is medically stable for discharge home with close outpatient follow up. Patient seen and examined on day of discharge.    ---  PATIENT CONDITION:  - stable    ---  PHYSICAL EXAM (On Date of Discharge):   ---VITALS---    GENERAL: NAD, appears stated age  HEENT: anicteric, eomi  CHEST/LUNG: CTA b/l, no rales, wheezes, or rhonchi  HEART: RRR, S1, S2  ABDOMEN: BS+, soft, nontender, nondistended  EXTREMITIES: no edema, cyanosis, or calf tenderness  NERVOUS SYSTEM: answers questions and follows commands appropriately    ---  CONSULTANTS:   Heme/Onc - Dr. Harmon  Vascular - Dr. Edwards  Cardio - Dr. Linda    ---  TIME SPENT:  I, the attending physician, was physically present for the key portions of the evaluation and management (E/M) service provided. The total amount of time spent reviewing the hospital notes, laboratory values, imaging findings, assessing/counseling the patient, discussing with consultant physicians, social work, nursing staff was -- minutes    ---  Primary care provider was made aware of plan for discharge:  [  ] NO     [  ] YES   (FROM ADMISSION H+P)  HPI:  71 yo M presents with LLE ankle swelling x 1 week. Patient noted about 7 days ago while taking a shower, he noted ankle swelling. He called his PCP who recommended to go to an urgent care. The swelling had not progressed. He did not some LLE calf tightness but no swelling or pain. Several days ago he was using a stationary bike and noted SOB at the end of his workout, which is abnormal for him. He was seen by urgent care this morning who recommended LLE ultrasound to r/o clot. As he was driving to [facility], he received a call from his PCP who recommended he go to the ER instead. He otherwise was feeling is normal state of health. Has no prior history of clots, no family history of coagulation disorders, no recent travel.   Patient is an avid runner, and active, previously ran marathons.     ER Course:   Vitals: 148/84 HR 60 RR 16 98% RA   Labs: D dimer: 3234  troponin negative   Imaging:   CTA: Bilateral multifocal pulmonary thromboemboli, lobar and   subsegmental/subsegmental branches. Suspect mild acute right heart strain.  LLE Doppler Ultrasound: Acute left leg deep vein thrombosis involving the distal femoral vein,   popliteal vein and posterior tibial vein. Acute deep venous thrombosis: above and below the knee.  ECG: sinus bradycardia @ 51 bpm   Started on heparin gtt    (06 Feb 2023 20:32)    ---  HOSPITAL COURSE:   Patient admitted with bilateral multifocal pulmonary embolisms and multiple DVTs above and below the knee in the LLE. Appears to be unprovoked as patient has no family hx of hypercoagulabilities and is active without any associated comorbidities. Heme, Vascular, and Cardio were consulted with noted possible mild right heart strain on CTA. Heparin drip was started and patient was transitioned to oral Eliquis, which will need to be continued lifelong and followed up with Heme outpatient. CTA venogram was performed, which showed no evidence of compression or thrombosis involving the left common iliac or external iliac veins. TTE was performed, which showed normal LVEF 50-60%, grossly normal RV size and function, mild MVP/MR, trace TR. No signs of R heart dysfunction. Patient's medical condition improved throughout hospital course and is medically stable for discharge home with close outpatient follow up. Patient seen and examined on day of discharge.    ---  PATIENT CONDITION:  - stable    ---  PHYSICAL EXAM (On Date of Discharge):   Vital Signs Last 24 Hrs  T(C): 36.8 (09 Feb 2023 04:56), Max: 36.8 (08 Feb 2023 19:56)  T(F): 98.3 (09 Feb 2023 04:56), Max: 98.3 (09 Feb 2023 04:56)  HR: 44 (09 Feb 2023 04:56) (44 - 50)  BP: 144/77 (09 Feb 2023 04:56) (131/71 - 147/75)  BP(mean): --  RR: 16 (09 Feb 2023 04:56) (16 - 18)  SpO2: 93% (09 Feb 2023 04:56) (93% - 95%)    Parameters below as of 09 Feb 2023 04:56  Patient On (Oxygen Delivery Method): room air    GENERAL: NAD, appears stated age  HEENT: anicteric, eomi  CHEST/LUNG: CTA b/l, no rales, wheezes, or rhonchi  HEART: RRR, S1, S2  ABDOMEN: BS+, soft, nontender, nondistended  EXTREMITIES: no edema, cyanosis, or calf tenderness  NERVOUS SYSTEM: answers questions and follows commands appropriately    ---  CONSULTANTS:   Heme/Onc - Dr. Harmon  Vascular - Dr. Edwards  Cardio - Dr. Linda    ---  TIME SPENT:  I, the attending physician, was physically present for the key portions of the evaluation and management (E/M) service provided. The total amount of time spent reviewing the hospital notes, laboratory values, imaging findings, assessing/counseling the patient, discussing with consultant physicians, social work, nursing staff was -- minutes   (FROM ADMISSION H+P)  HPI:  71 yo M presents with LLE ankle swelling x 1 week. Patient noted about 7 days ago while taking a shower, he noted ankle swelling. He called his PCP who recommended to go to an urgent care. The swelling had not progressed. He did not some LLE calf tightness but no swelling or pain. Several days ago he was using a stationary bike and noted SOB at the end of his workout, which is abnormal for him. He was seen by urgent care this morning who recommended LLE ultrasound to r/o clot. As he was driving to [facility], he received a call from his PCP who recommended he go to the ER instead. He otherwise was feeling is normal state of health. Has no prior history of clots, no family history of coagulation disorders, no recent travel.   Patient is an avid runner, and active, previously ran marathons.     ER Course:   Vitals: 148/84 HR 60 RR 16 98% RA   Labs: D dimer: 3234  troponin negative   Imaging:   CTA: Bilateral multifocal pulmonary thromboemboli, lobar and   subsegmental/subsegmental branches. Suspect mild acute right heart strain.  LLE Doppler Ultrasound: Acute left leg deep vein thrombosis involving the distal femoral vein,   popliteal vein and posterior tibial vein. Acute deep venous thrombosis: above and below the knee.  ECG: sinus bradycardia @ 51 bpm   Started on heparin gtt    (06 Feb 2023 20:32)    ---  HOSPITAL COURSE:   Patient admitted with bilateral multifocal pulmonary embolisms and multiple DVTs above and below the knee in the LLE. Appears to be unprovoked as patient has no family hx of hypercoagulabilities and is active without any associated comorbidities. Heme, Vascular, and Cardio were consulted with noted possible mild right heart strain on CTA. Heparin drip was started and patient was transitioned to oral Eliquis, which will need to be continued lifelong and followed up with Heme outpatient. CTA venogram was performed, which showed no evidence of compression or thrombosis involving the left common iliac or external iliac veins. TTE was performed, which showed normal LVEF 50-60%, grossly normal RV size and function, mild MVP/MR, trace TR. No signs of R heart dysfunction. Patient's medical condition improved throughout hospital course and is medically stable for discharge home with close outpatient follow up. Patient seen and examined on day of discharge.    ---  PATIENT CONDITION:  - stable    ---  PHYSICAL EXAM (On Date of Discharge):   Vital Signs Last 24 Hrs  T(C): 36.8 (09 Feb 2023 04:56), Max: 36.8 (08 Feb 2023 19:56)  T(F): 98.3 (09 Feb 2023 04:56), Max: 98.3 (09 Feb 2023 04:56)  HR: 44 (09 Feb 2023 04:56) (44 - 50)  BP: 144/77 (09 Feb 2023 04:56) (131/71 - 147/75)  BP(mean): --  RR: 16 (09 Feb 2023 04:56) (16 - 18)  SpO2: 93% (09 Feb 2023 04:56) (93% - 95%)    Parameters below as of 09 Feb 2023 04:56  Patient On (Oxygen Delivery Method): room air    GENERAL: NAD, appears stated age  HEENT: anicteric, eomi  CHEST/LUNG: CTA b/l, no rales, wheezes, or rhonchi  HEART: RRR, S1, S2  ABDOMEN: BS+, soft, nontender, nondistended  EXTREMITIES: no edema, cyanosis, or calf tenderness  NERVOUS SYSTEM: answers questions and follows commands appropriately    ---  CONSULTANTS:   Heme/Onc - Dr. Harmon  Vascular - Dr. Edwards  Cardio - Dr. Linda    ---  TIME SPENT:  I, the attending physician, was physically present for the key portions of the evaluation and management (E/M) service provided. The total amount of time spent reviewing the hospital notes, laboratory values, imaging findings, assessing/counseling the patient, discussing with consultant physicians, social work, nursing staff was 50 minutes

## 2023-02-07 NOTE — CONSULT NOTE ADULT - ASSESSMENT
71 yo man not on prescription meds and has seen PMD recently due to COVID pandemic.   Presented w 4-5 day hx left foot mild edema slow progressed to left calf, not assoc w pain or color change. Also in retrospect MAGANA /less exeercise tolerance x 1wk, seen at Urgent Care told possible DVT, and also instructed by PMD to go to ER    Duplex w left leg DVT distal femoral, pop, post tibial  CTA chest yao multifocal PE, suspect mild right heart strain  Started on Heparin, left foot swelling decreased, no SOB CP    No recent prolonged immobility, not on androgen  No family hx thromboembolism    -unprovoked yao PE and left leg DVT from distal femoral to pop, post tibial  -needs lifetime anticoagualtion  -left foot ankle and calf edema already improving, no respiratory sx at rest  -agree w vascular, cardiology consults vikki in view of possible right heart strain  -if no other acute cardiology, vascular intervention needed, when ready for discharge and change Heparin to oral DOAC such as Eliquis  -will see pt in office after discharge to continue workup as needed. However, results would not     thank you, please call if any questions  
71 yo M with no significant PMH presented to ED c/o LLE tightness and out of the ordinary shortness of breath after cardio exercise. Admitted for PE and LLE above and below knee DVTs.    DVT/PE  - Continue Heparin gtt for now, will require transition to oral AC on dc     R Heart Strain  - CTA reveals mild R heart strain  - Patient not complaining of cardiac symptoms at this time  - No meaningful evidence of volume overload  - No acute ischemic changes on EKG  - F/u Echo for evaluation of cardiac function  - Other cardiovascular workup will depend on clinical course  - Will continue to follow                        
73 y/o M with unprovoked LLE DVT and bilat PE  GI/colonoscopy approximately 3 y/a neg  PSA last checked 3 y/a and reportedly normal  On Heparin gtt with improved edema  Appreciate Heme consult  Would obtain CT venogram abd/pelvis to R/O May Thurner Cont AC  Further recommendations following imaging  Discussed with Dr Rollins

## 2023-02-07 NOTE — CONSULT NOTE ADULT - SUBJECTIVE AND OBJECTIVE BOX
[facility]         Shon Higgins, Walter, Alexei, Bennie, Camron, Marcos        [phone] (office)    Reason for Consult: R heart strain on CTA    HPI: 73 yo M with no significant PMH presented to ED c/o LLE tightness and out of the ordinary shortness of breath after cardio exercise. Pt found to have LLE above and below knee DVTs, as well as PE and R heart strain on CTA. Remained hemodynamically stable. Pt seen and examined at bedside. Admits to resting HR high 50s to low 60s at baseline. Does not follow with Cardio outpt. Denies headache, dizziness, changes in vision, chest pain, palpitations, shortness of breath, abdominal pain, nausea/vomiting.     HPI from admission:  73 yo M presents with LLE ankle swelling x 1 week. Patient noted about 7 days ago while taking a shower, he noted ankle swelling. He called his PCP who recommended to go to an urgent care. The swelling had not progressed. He did not some LLE calf tightness but no swelling or pain. Several days ago he was using a stationary bike and noted SOB at the end of his workout, which is abnormal for him. He was seen by urgent care this morning who recommended LLE ultrasound to r/o clot. As he was driving to [facility], he received a call from his PCP who recommended he go to the ER instead. He otherwise was feeling is normal state of health. Has no prior history of clots, no family history of coagulation disorders, no recent travel.   Patient is an avid runner, and active, previously ran marathons.     ER Course:   Vitals: 148/84 HR 60 RR 16 98% RA   Labs: D dimer: 3234  troponin negative   Imaging:   CTA: Bilateral multifocal pulmonary thromboemboli, lobar and   subsegmental/subsegmental branches. Suspect mild acute right heart strain.  LLE Doppler Ultrasound: Acute left leg deep vein thrombosis involving the distal femoral vein,   popliteal vein and posterior tibial vein. Acute deep venous thrombosis: above and below the knee.  ECG: sinus bradycardia @ 51 bpm   Started on heparin gtt    (06 Feb 2023 20:32)      PAST MEDICAL & SURGICAL HISTORY:  No pertinent past medical history      [place] teeth extracted          SOCIAL HISTORY: No active tobacco, alcohol or illicit drug use    FAMILY HISTORY:  FH: type 2 diabetes (Mother)        Home Medications:  Pepcid 20 mg oral tablet: 1 tab(s) orally once a day (06 Feb 2023 20:37)      MEDICATIONS  (STANDING):  heparin  Infusion.  Unit(s)/Hr (15 mL/Hr) IV Continuous <Continuous>    MEDICATIONS  (PRN):  acetaminophen     Tablet .. 650 milliGRAM(s) Oral every 6 hours PRN Temp greater or equal to 38C (100.4F), Mild Pain (1 - 3)  aluminum hydroxide/magnesium hydroxide/simethicone Suspension 30 milliLiter(s) Oral every 4 hours PRN Dyspepsia  heparin   Injectable 6500 Unit(s) IV Push every 6 hours PRN For aPTT less than 40  heparin   Injectable 3000 Unit(s) IV Push every 6 hours PRN For aPTT between 40 - 57  melatonin 3 milliGRAM(s) Oral at bedtime PRN Insomnia  ondansetron Injectable 4 milliGRAM(s) IV Push every 8 hours PRN Nausea and/or Vomiting      Allergies    No Known Allergies    Intolerances        REVIEW OF SYSTEMS: Negative for constitutional, HEENT, cardio, pulm, GI, MSK except as per HPI    VITAL SIGNS:   Vital Signs Last 24 Hrs  T(C): 36.8 (07 Feb 2023 05:16), Max: 37 (06 Feb 2023 22:53)  T(F): 98.2 (07 Feb 2023 05:16), Max: 98.6 (06 Feb 2023 22:53)  HR: 46 (07 Feb 2023 05:16) (46 - 60)  BP: 125/75 (07 Feb 2023 05:16) (125/75 - 159/78)  BP(mean): --  RR: 17 (07 Feb 2023 05:16) (14 - 18)  SpO2: 94% (07 Feb 2023 05:16) (94% - 98%)    Parameters below as of 07 Feb 2023 05:16  Patient On (Oxygen Delivery Method): room air        I&O's Summary    06 Feb 2023 07:01  -  07 Feb 2023 07:00  --------------------------------------------------------  IN: 200 mL / OUT: 450 mL / NET: -250 mL        PHYSICAL EXAM:  Constitutional: NAD, well-appearing, pleasant  HEENT: NC/AT, moist mucous membranes  Pulmonary: Non-labored, breath sounds are clear bilaterally, no wheezing, rales or rhonchi  Cardiovascular: +S1, S2, RRR, no murmur  Gastrointestinal: Soft, nontender, nondistended  Extremities: mild non-pitting edema of LLE, no calf tenderness  Neurological: AAOx3, appropriately responsive to questions and commands  Skin: No obvious lesions/rashes  Psych: Mood & affect appropriate    LABS: All Labs Reviewed:                        13.0   4.65  )-----------( 217      ( 07 Feb 2023 02:34 )             38.1                         13.4   5.17  )-----------( 232      ( 06 Feb 2023 16:30 )             40.6     07 Feb 2023 06:29    142    |  108    |  15     ----------------------------<  89     4.2     |  28     |  0.90   06 Feb 2023 16:30    141    |  109    |  15     ----------------------------<  87     4.3     |  26     |  0.92     Ca    8.9        07 Feb 2023 06:29  Ca    8.7        06 Feb 2023 16:30    TPro  6.1    /  Alb  3.0    /  TBili  0.6    /  DBili  x      /  AST  28     /  ALT  31     /  AlkPhos  91     07 Feb 2023 06:29  TPro  6.5    /  Alb  3.2    /  TBili  0.6    /  DBili  x      /  AST  31     /  ALT  35     /  AlkPhos  105    06 Feb 2023 16:30    PT/INR - ( 06 Feb 2023 16:30 )   PT: 12.0 sec;   INR: 1.03 ratio         PTT - ( 07 Feb 2023 11:07 )  PTT:70.7 sec      Blood Culture:   02-06 @ 16:30  Pro Bnp 186        EKG:    RADIOLOGY:    CXR:

## 2023-02-07 NOTE — DISCHARGE NOTE PROVIDER - NSDCCPCAREPLAN_GEN_ALL_CORE_FT
PRINCIPAL DISCHARGE DIAGNOSIS  Diagnosis: Pulmonary embolism  Assessment and Plan of Treatment: You were found to have bilateral and multiple pulmonary embolisms (blood clots in your lung). You were managed in the hospital with heparin and transitioned to an oral blood thinner.  Please begin to take Eliquis 10mg twice daily for ___ days and then 5mg afterwards and follow up with the heme doctor Dr. Harmon in 1 week for further evaluation and management.      SECONDARY DISCHARGE DIAGNOSES  Diagnosis: DVT, lower extremity  Assessment and Plan of Treatment: You were found to have multiple blood clots in your left leg as noted on ultrasound. You were managed with heparin and trasnitioned to an oral blood thinner.  Please follow care management as noted above.     PRINCIPAL DISCHARGE DIAGNOSIS  Diagnosis: Pulmonary embolism  Assessment and Plan of Treatment: You were found to have bilateral and multiple pulmonary embolisms (blood clots in your lung). You were managed in the hospital with heparin and transitioned to an oral blood thinner.  Please begin to take Eliquis 10mg twice daily for 6 days and then 5mg twice daily afterwards and follow up with the [facility] doctor Dr. Harmon for further evaluation and management.      SECONDARY DISCHARGE DIAGNOSES  Diagnosis: DVT, lower extremity  Assessment and Plan of Treatment: You were found to have multiple blood clots in your left leg as noted on ultrasound. You were managed with heparin and trasnitioned to an oral blood thinner.  Please follow care management as noted above.     PRINCIPAL DISCHARGE DIAGNOSIS  Diagnosis: Pulmonary embolism  Assessment and Plan of Treatment: You were found to have bilateral and multiple pulmonary embolisms (blood clots in your lung). You were managed in the hospital with heparin and transitioned to an oral blood thinner.  Please begin to take Eliquis 10mg twice daily for 6 days and then 5mg twice daily afterwards for an indefinite amount of time (likely lifelong) and follow up with the [facility] doctor Dr. Mag Harmon for further evaluation and management.      SECONDARY DISCHARGE DIAGNOSES  Diagnosis: DVT, lower extremity  Assessment and Plan of Treatment: You were found to have multiple blood clots in your left leg as noted on ultrasound. You were managed with heparin and transitioned to an oral blood thinner.  Please follow care management as noted above.

## 2023-02-07 NOTE — CARE COORDINATION ASSESSMENT. - NSPASTMEDSURGHISTORY_GEN_ALL_CORE_FT
PAST MEDICAL & SURGICAL HISTORY:  No pertinent past medical history      [place] teeth extracted

## 2023-02-08 LAB
ANION GAP SERPL CALC-SCNC: 7 MMOL/L — SIGNIFICANT CHANGE UP (ref 5–17)
APTT BLD: 65.4 SEC — HIGH (ref 27.5–35.5)
BUN SERPL-MCNC: 16 MG/DL — SIGNIFICANT CHANGE UP (ref 7–23)
CALCIUM SERPL-MCNC: 9.3 MG/DL — SIGNIFICANT CHANGE UP (ref 8.5–10.1)
CHLORIDE SERPL-SCNC: 109 MMOL/L — HIGH (ref 96–108)
CO2 SERPL-SCNC: 27 MMOL/L — SIGNIFICANT CHANGE UP (ref 22–31)
CREAT SERPL-MCNC: 1 MG/DL — SIGNIFICANT CHANGE UP (ref 0.5–1.3)
EGFR: 79 ML/MIN/1.73M2 — SIGNIFICANT CHANGE UP
GLUCOSE SERPL-MCNC: 86 MG/DL — SIGNIFICANT CHANGE UP (ref 70–99)
HCT VFR BLD CALC: 44 % — SIGNIFICANT CHANGE UP (ref 39–50)
HGB BLD-MCNC: 14.7 G/DL — SIGNIFICANT CHANGE UP (ref 13–17)
MCHC RBC-ENTMCNC: 30.9 PG — SIGNIFICANT CHANGE UP (ref 27–34)
MCHC RBC-ENTMCNC: 33.4 GM/DL — SIGNIFICANT CHANGE UP (ref 32–36)
MCV RBC AUTO: 92.6 FL — SIGNIFICANT CHANGE UP (ref 80–100)
NRBC # BLD: 0 /100 WBCS — SIGNIFICANT CHANGE UP (ref 0–0)
PLATELET # BLD AUTO: 279 K/UL — SIGNIFICANT CHANGE UP (ref 150–400)
POTASSIUM SERPL-MCNC: 4.4 MMOL/L — SIGNIFICANT CHANGE UP (ref 3.5–5.3)
POTASSIUM SERPL-SCNC: 4.4 MMOL/L — SIGNIFICANT CHANGE UP (ref 3.5–5.3)
RBC # BLD: 4.75 M/UL — SIGNIFICANT CHANGE UP (ref 4.2–5.8)
RBC # FLD: 12.1 % — SIGNIFICANT CHANGE UP (ref 10.3–14.5)
SODIUM SERPL-SCNC: 143 MMOL/L — SIGNIFICANT CHANGE UP (ref 135–145)
WBC # BLD: 5.42 K/UL — SIGNIFICANT CHANGE UP (ref 3.8–10.5)
WBC # FLD AUTO: 5.42 K/UL — SIGNIFICANT CHANGE UP (ref 3.8–10.5)

## 2023-02-08 PROCEDURE — 99232 SBSQ HOSP IP/OBS MODERATE 35: CPT

## 2023-02-08 RX ORDER — APIXABAN 2.5 MG/1
10 TABLET, FILM COATED ORAL EVERY 12 HOURS
Refills: 0 | Status: DISCONTINUED | OUTPATIENT
Start: 2023-02-08 | End: 2023-02-09

## 2023-02-08 RX ADMIN — HEPARIN SODIUM 1200 UNIT(S)/HR: 5000 INJECTION INTRAVENOUS; SUBCUTANEOUS at 10:19

## 2023-02-08 RX ADMIN — APIXABAN 10 MILLIGRAM(S): 2.5 TABLET, FILM COATED ORAL at 17:46

## 2023-02-08 RX ADMIN — HEPARIN SODIUM 1200 UNIT(S)/HR: 5000 INJECTION INTRAVENOUS; SUBCUTANEOUS at 11:59

## 2023-02-08 RX ADMIN — HEPARIN SODIUM 1200 UNIT(S)/HR: 5000 INJECTION INTRAVENOUS; SUBCUTANEOUS at 07:15

## 2023-02-08 NOTE — PROGRESS NOTE ADULT - ASSESSMENT
71 yo M with no significant PMH presented to ED c/o LLE tightness and out of the ordinary shortness of breath after cardio exercise. Admitted for PE and LLE above and below knee DVTs.    DVT/PE,   - CTA revealing bilateral multifocal pulmonary emboli, likely originated from LLE DVT, appears unprovoked  - Continue Heparin gtt for now, will require transition to oral AC   - Will need lifelong anticoagulation as per heme  - CTA reveals mild R heart strain. F/u ECHO. Pending results   - Patient not complaining of cardiac symptoms at this time  - No meaningful evidence of volume overload  - No acute ischemic changes on EKG    - Monitor and replete lytes, keep K>4, Mg>2.  - Will continue to follow.    Cyrus Owen, MS FNP, [facility]  Nurse Practitioner- Cardiology   Spectra #[phone] /[phone] 73 yo M with no significant PMH presented to ED c/o LLE tightness and out of the ordinary shortness of breath after cardio exercise. Admitted for PE and LLE above and below knee DVTs.    DVT/PE,   - CTA revealing bilateral multifocal pulmonary emboli, likely originated from LLE DVT, appears unprovoked  - Continue Heparin gtt for now, will require transition to oral AC   - Will need lifelong anticoagulation as per heme  - CTA reveals mild R heart strain. F/u ECHO. Pending results   - Patient not complaining of cardiac symptoms at this time  - Tele shows SR 40s, nonsustained 60s. Per pt his HR is always on the low side. If no events in next 24 hours, can d/c tele   - No meaningful evidence of volume overload  - No acute ischemic changes on EKG    - Monitor and replete lytes, keep K>4, Mg>2.  - Will continue to follow.    Cyrus Owen, MS FNP, [facility]  Nurse Practitioner- Cardiology   Spectra #[phone] /[phone]

## 2023-02-08 NOTE — PROGRESS NOTE ADULT - PROBLEM SELECTOR PLAN 2
DVTs found to have above and below the knee DVTs   - no known risk factors; active life style, no a fib, no fhx coagulopathies, BMI wnl; likely unprovoked  - hep gtt  - plan as noted above
DVTs found to have above and below the knee DVTs   - no known risk factors; active life style, no a fib, no fhx coagulopathies, BMI wnl; likely unprovoked  - plan as noted above

## 2023-02-08 NOTE — PROGRESS NOTE ADULT - PROBLEM SELECTOR PLAN 1
CTA revealing bilateral multifocal pulmonary emboli  - likely originated from LLE DVT, appears unprovoked  - heparin gtt  - f/u TTE results (CTA showed mild right heart strain)  - pending CTA venogram abd/pelvis  - will need lifelong anticoagulation as per heme; transition to coags once stable  - patient currently hemodynamically stable  - cardio Dr. Linda's group consulted, recs appreciated  - heme Dr. Harmon consulted, recs appreciated  - vascular Dr. Edwards consulted, recs appreciated
CTA revealing bilateral multifocal pulmonary emboli  - likely originated from LLE DVT, appears unprovoked  - heparin gtt  - will transition to eliquis 10mg BID for x7 days tonight then 5mg afterwards; explained to patient  - CTA venogram abd/pelvis negative for any thrombosis  - f/u TTE official results; prelim did not show any significant right heart pathologies with normal pHTN  - will need lifelong anticoagulation as per heme  - patient currently hemodynamically stable  - cardio Dr. Linda's group following  - heme Dr. Harmon consulted, recs appreciated - can follow up outpt  - vascular Dr. Edwards consulted, recs appreciated - no intervention needed with negative CTA venogram

## 2023-02-08 NOTE — PROGRESS NOTE ADULT - PROBLEM SELECTOR PLAN 4
fully anticoagulated on hep gtt - will transition to eliquis tonight
fully anticoagulated on hep gtt

## 2023-02-08 NOTE — PROGRESS NOTE ADULT - ASSESSMENT
73 y/o M with unprovoked LLE DVT and bilat PE  GI/colonoscopy approximately 3 y/a neg  PSA last checked 3 y/a and reportedly normal  On Heparin gtt with improved edema  Appreciate Heme consult  CT venogram abd/pelvis R/O May Thurner-no clear iliac vein thrombus  No vascular surgical intervention indicated  Cont AC-can change to DOAC when deemed appropriate by medicine  PAYTON stocking for compression therapy on discharge  Will sign off  Discussed with Dr Rollins

## 2023-02-08 NOTE — PROGRESS NOTE ADULT - ASSESSMENT
71 yo M presents with LLE ankle swelling x 1 week and SOB admitted for acute bilateral pulmonary emboli.

## 2023-02-09 VITALS
RESPIRATION RATE: 18 BRPM | DIASTOLIC BLOOD PRESSURE: 83 MMHG | HEART RATE: 65 BPM | TEMPERATURE: 98 F | OXYGEN SATURATION: 92 % | SYSTOLIC BLOOD PRESSURE: 155 MMHG

## 2023-02-09 LAB
ANION GAP SERPL CALC-SCNC: 5 MMOL/L — SIGNIFICANT CHANGE UP (ref 5–17)
APTT BLD: 37 SEC — HIGH (ref 27.5–35.5)
BUN SERPL-MCNC: 16 MG/DL — SIGNIFICANT CHANGE UP (ref 7–23)
CALCIUM SERPL-MCNC: 9.2 MG/DL — SIGNIFICANT CHANGE UP (ref 8.5–10.1)
CHLORIDE SERPL-SCNC: 108 MMOL/L — SIGNIFICANT CHANGE UP (ref 96–108)
CO2 SERPL-SCNC: 30 MMOL/L — SIGNIFICANT CHANGE UP (ref 22–31)
CREAT SERPL-MCNC: 1.1 MG/DL — SIGNIFICANT CHANGE UP (ref 0.5–1.3)
EGFR: 70 ML/MIN/1.73M2 — SIGNIFICANT CHANGE UP
GLUCOSE SERPL-MCNC: 84 MG/DL — SIGNIFICANT CHANGE UP (ref 70–99)
HCT VFR BLD CALC: 43.5 % — SIGNIFICANT CHANGE UP (ref 39–50)
HGB BLD-MCNC: 14.6 G/DL — SIGNIFICANT CHANGE UP (ref 13–17)
MCHC RBC-ENTMCNC: 31.7 PG — SIGNIFICANT CHANGE UP (ref 27–34)
MCHC RBC-ENTMCNC: 33.6 GM/DL — SIGNIFICANT CHANGE UP (ref 32–36)
MCV RBC AUTO: 94.4 FL — SIGNIFICANT CHANGE UP (ref 80–100)
NRBC # BLD: 0 /100 WBCS — SIGNIFICANT CHANGE UP (ref 0–0)
PLATELET # BLD AUTO: 256 K/UL — SIGNIFICANT CHANGE UP (ref 150–400)
POTASSIUM SERPL-MCNC: 4.6 MMOL/L — SIGNIFICANT CHANGE UP (ref 3.5–5.3)
POTASSIUM SERPL-SCNC: 4.6 MMOL/L — SIGNIFICANT CHANGE UP (ref 3.5–5.3)
RBC # BLD: 4.61 M/UL — SIGNIFICANT CHANGE UP (ref 4.2–5.8)
RBC # FLD: 11.8 % — SIGNIFICANT CHANGE UP (ref 10.3–14.5)
SODIUM SERPL-SCNC: 143 MMOL/L — SIGNIFICANT CHANGE UP (ref 135–145)
WBC # BLD: 4.49 K/UL — SIGNIFICANT CHANGE UP (ref 3.8–10.5)
WBC # FLD AUTO: 4.49 K/UL — SIGNIFICANT CHANGE UP (ref 3.8–10.5)

## 2023-02-09 PROCEDURE — 93005 ELECTROCARDIOGRAM TRACING: CPT

## 2023-02-09 PROCEDURE — 84484 ASSAY OF TROPONIN QUANT: CPT

## 2023-02-09 PROCEDURE — 71275 CT ANGIOGRAPHY CHEST: CPT | Mod: MA

## 2023-02-09 PROCEDURE — 99285 EMERGENCY DEPT VISIT HI MDM: CPT | Mod: 25

## 2023-02-09 PROCEDURE — 99239 HOSP IP/OBS DSCHRG MGMT >30: CPT | Mod: GC

## 2023-02-09 PROCEDURE — 93306 TTE W/DOPPLER COMPLETE: CPT

## 2023-02-09 PROCEDURE — 85730 THROMBOPLASTIN TIME PARTIAL: CPT

## 2023-02-09 PROCEDURE — 99232 SBSQ HOSP IP/OBS MODERATE 35: CPT

## 2023-02-09 PROCEDURE — 80053 COMPREHEN METABOLIC PANEL: CPT

## 2023-02-09 PROCEDURE — 87637 SARSCOV2&INF A&B&RSV AMP PRB: CPT

## 2023-02-09 PROCEDURE — 74177 CT ABD & PELVIS W/CONTRAST: CPT

## 2023-02-09 PROCEDURE — 85027 COMPLETE CBC AUTOMATED: CPT

## 2023-02-09 PROCEDURE — 74174 CTA ABD&PLVS W/CONTRAST: CPT

## 2023-02-09 PROCEDURE — 86803 HEPATITIS C AB TEST: CPT

## 2023-02-09 PROCEDURE — 36415 COLL VENOUS BLD VENIPUNCTURE: CPT

## 2023-02-09 PROCEDURE — 85379 FIBRIN DEGRADATION QUANT: CPT

## 2023-02-09 PROCEDURE — 85025 COMPLETE CBC W/AUTO DIFF WBC: CPT

## 2023-02-09 PROCEDURE — 85610 PROTHROMBIN TIME: CPT

## 2023-02-09 PROCEDURE — 83880 ASSAY OF NATRIURETIC PEPTIDE: CPT

## 2023-02-09 PROCEDURE — 93971 EXTREMITY STUDY: CPT

## 2023-02-09 PROCEDURE — 80048 BASIC METABOLIC PNL TOTAL CA: CPT

## 2023-02-09 RX ORDER — APIXABAN 2.5 MG/1
1 TABLET, FILM COATED ORAL
Qty: 120 | Refills: 0
Start: 2023-02-09 | End: 2023-04-09

## 2023-02-09 RX ORDER — APIXABAN 2.5 MG/1
2 TABLET, FILM COATED ORAL
Qty: 24 | Refills: 0
Start: 2023-02-09 | End: 2023-02-14

## 2023-02-09 RX ADMIN — APIXABAN 10 MILLIGRAM(S): 2.5 TABLET, FILM COATED ORAL at 05:28

## 2023-02-09 NOTE — PROGRESS NOTE ADULT - ASSESSMENT
71 yo M with no significant PMH presented to ED c/o LLE tightness and out of the ordinary shortness of breath after cardio exercise. Admitted for PE and LLE above and below knee DVTs.    DVT/PE,   - CTA revealing bilateral multifocal pulmonary emboli, likely originated from LLE DVT, appears unprovoked  - Continue Eliquis  - Will need lifelong anticoagulation as per heme  - CTA reveals mild R heart strain.  - ECHO showed normal LV & RV size and function EF 55-60%, mild MR, trace TR, mild MVP  - Patient not complaining of cardiac symptoms at this time  - Tele shows SR 40s, Per pt his HR is always on the low side.  can d/c tele   - No meaningful evidence of volume overload  - No acute ischemic changes on EKG    - Monitor and replete lytes, keep K>4, Mg>2.  - Will continue to follow.    Cyrus Owen MS FNP, [facility]  Nurse Practitioner- Cardiology   Spectra #[phone] /[phone]

## 2023-02-09 NOTE — DISCHARGE NOTE NURSING/CASE MANAGEMENT/SOCIAL WORK - NSTRANSFERBELONGINGSRESP_GEN_A_NUR
Called mom to let her know that the form was ready for  and in patient pick folder.      Form given to Nohemy this morning. Patients mom was told that it takes 7 to 10 days to fill out forms. But mom states that she needs asap.    yes

## 2023-02-09 NOTE — CASE MANAGEMENT PROGRESS NOTE - NSCMPROGRESSNOTE_GEN_ALL_CORE
Discussed patient on rounds this morning and with MD. Per MD, patient is medically cleared for transition home today. CM met with the patient at the bedside to discuss the transition plan. Patient stated he will follow up with his outpatient providers and declines the need for a visiting nurse. Patient is aware that he will be discharged on po Eliquis. No further skilled transition needs identified. Patient verbalized understanding of the discharge plan and is in agreement. CM remains available.

## 2023-02-09 NOTE — DISCHARGE NOTE NURSING/CASE MANAGEMENT/SOCIAL WORK - PATIENT PORTAL LINK FT
You can access the FollowMyHealth Patient Portal offered by [facility] by registering at the following website: [email or web address]. By joining [employer]’s FollowMyHealth portal, you will also be able to view your health information using other applications (apps) compatible with our system.

## 2023-02-09 NOTE — DISCHARGE NOTE NURSING/CASE MANAGEMENT/SOCIAL WORK - NSDCPEFALRISK_GEN_ALL_CORE
For information on Fall & Injury Prevention, visit: [email or web address] OR  [email or web address] OR  https://www.cdc.gov/steadi/patient.html

## 2023-02-09 NOTE — PROGRESS NOTE ADULT - SUBJECTIVE AND OBJECTIVE BOX
Patient is a 72y old  Male who presents with a chief complaint of PE (08 Feb 2023 10:03)      INTERVAL HPI/OVERNIGHT EVENTS:  No acute events overnight. Patient examined at bedside this AM in NAD. Patient feels well this morning. No complaints. Looking forward to going home and continuing his exercise routines. Understands that he needs to be on blood thinners starting from now.      MEDICATIONS  (STANDING):  apixaban 10 milliGRAM(s) Oral every 12 hours  heparin  Infusion.  Unit(s)/Hr (15 mL/Hr) IV Continuous <Continuous>    MEDICATIONS  (PRN):  acetaminophen     Tablet .. 650 milliGRAM(s) Oral every 6 hours PRN Temp greater or equal to 38C (100.4F), Mild Pain (1 - 3)  aluminum hydroxide/magnesium hydroxide/simethicone Suspension 30 milliLiter(s) Oral every 4 hours PRN Dyspepsia  heparin   Injectable 6500 Unit(s) IV Push every 6 hours PRN For aPTT less than 40  heparin   Injectable 3000 Unit(s) IV Push every 6 hours PRN For aPTT between 40 - 57  melatonin 3 milliGRAM(s) Oral at bedtime PRN Insomnia  ondansetron Injectable 4 milliGRAM(s) IV Push every 8 hours PRN Nausea and/or Vomiting      Allergies    No Known Allergies    Intolerances      REVIEW OF SYSTEMS:  CONSTITUTIONAL: No fever or chills  HEENT: No headache, sore throat  RESPIRATORY: No cough, wheezing, or shortness of breath  CARDIOVASCULAR: No chest pain, palpitations  GASTROINTESTINAL: No abd pain, nausea, vomiting, or diarrhea  GENITOURINARY: No dysuria, frequency, or hematuria  NEUROLOGICAL: No focal weakness or dizziness  MUSCULOSKELETAL: No myalgias       Vital Signs Last 24 Hrs  T(C): 36.4 (08 Feb 2023 05:01), Max: 36.6 (07 Feb 2023 19:42)  T(F): 97.6 (08 Feb 2023 05:01), Max: 97.8 (07 Feb 2023 19:42)  HR: 45 (08 Feb 2023 05:01) (45 - 53)  BP: 142/77 (08 Feb 2023 05:01) (100/60 - 142/77)  BP(mean): --  RR: 18 (08 Feb 2023 05:01) (18 - 18)  SpO2: 95% (08 Feb 2023 05:01) (95% - 95%)    Parameters below as of 08 Feb 2023 05:01  Patient On (Oxygen Delivery Method): room air      PHYSICAL EXAM:  GENERAL: NAD, appears stated age  HEENT: anicteric, eomi  CHEST/LUNG: CTA b/l, no rales, wheezes, or rhonchi  HEART: RRR, S1, S2  ABDOMEN: BS+, soft, nontender, nondistended  EXTREMITIES: no edema, cyanosis, or calf tenderness  NERVOUS SYSTEM: answers questions and follows commands appropriately      LABS:                        14.7   5.42  )-----------( 279      ( 08 Feb 2023 07:19 )             44.0     CBC Full  -  ( 08 Feb 2023 07:19 )  WBC Count : 5.42 K/uL  Hemoglobin : 14.7 g/dL  Hematocrit : 44.0 %  Platelet Count - Automated : 279 K/uL  Mean Cell Volume : 92.6 fl  Mean Cell Hemoglobin : 30.9 pg  Mean Cell Hemoglobin Concentration : 33.4 gm/dL  Auto Neutrophil # : x  Auto Lymphocyte # : x  Auto Monocyte # : x  Auto Eosinophil # : x  Auto Basophil # : x  Auto Neutrophil % : x  Auto Lymphocyte % : x  Auto Monocyte % : x  Auto Eosinophil % : x  Auto Basophil % : x    08 Feb 2023 07:19    143    |  109    |  16     ----------------------------<  86     4.4     |  27     |  1.00     Ca    9.3        08 Feb 2023 07:19      PT/INR - ( 06 Feb 2023 16:30 )   PT: 12.0 sec;   INR: 1.03 ratio         PTT - ( 08 Feb 2023 07:19 )  PTT:65.4 sec    CAPILLARY BLOOD GLUCOSE      RADIOLOGY & ADDITIONAL TESTS:      Consultant(s) Notes Reviewed:  [x] YES  [ ] NO
[facility] -- Alexei Menendez Pannella, Patel, Savella, Goodger: Office # [phone]    Follow Up:  PE, DVT    Subjective/Observations: Patient awake, alert, resting in bed. Denies chest pain, palpitations and dizziness. Denies any difficulty breathing. No orthopnea and PND. Tolerating room air.       REVIEW OF SYSTEMS: All other review of systems are negative unless indicated above    PAST MEDICAL & SURGICAL HISTORY:  No pertinent past medical history  [place] teeth extracted    MEDICATIONS  (STANDING):  apixaban 10 milliGRAM(s) Oral every 12 hours    MEDICATIONS  (PRN):  acetaminophen     Tablet .. 650 milliGRAM(s) Oral every 6 hours PRN Temp greater or equal to 38C (100.4F), Mild Pain (1 - 3)  aluminum hydroxide/magnesium hydroxide/simethicone Suspension 30 milliLiter(s) Oral every 4 hours PRN Dyspepsia  melatonin 3 milliGRAM(s) Oral at bedtime PRN Insomnia  ondansetron Injectable 4 milliGRAM(s) IV Push every 8 hours PRN Nausea and/or Vomiting    Allergies    No Known Allergies    Intolerances      Vital Signs Last 24 Hrs  T(C): 36.9 (09 Feb 2023 09:45), Max: 36.9 (09 Feb 2023 09:45)  T(F): 98.4 (09 Feb 2023 09:45), Max: 98.4 (09 Feb 2023 09:45)  HR: 57 (09 Feb 2023 09:45) (44 - 57)  BP: 127/77 (09 Feb 2023 09:45) (127/77 - 147/75)  BP(mean): --  RR: 18 (09 Feb 2023 09:45) (16 - 18)  SpO2: 93% (09 Feb 2023 09:45) (93% - 95%)    Parameters below as of 09 Feb 2023 09:45  Patient On (Oxygen Delivery Method): room air      I&O's Summary    08 Feb 2023 07:01  -  09 Feb 2023 07:00  --------------------------------------------------------  IN: 200 mL / OUT: 0 mL / NET: 200 mL      Weight (kg): 81.6 (02-09 @ 08:08)      TELE: SB 40s  PHYSICAL EXAM:  Constitutional: NAD, awake and alert  HEENT: Moist Mucous Membranes, Anicteric  Pulmonary: Non-labored, breath sounds are clear bilaterally, No wheezing, rales or rhonchi  Cardiovascular: Regular, S1 and S2, No murmurs, No rubs, gallops or clicks  Gastrointestinal:  soft, nontender, nondistended   Lymph: No peripheral edema. No lymphadenopathy.   Skin: No visible rashes or ulcers.  Psych:  Mood & affect appropriate    LABS: All Labs Reviewed:                        14.6   4.49  )-----------( 256      ( 09 Feb 2023 06:49 )             43.5                         14.7   5.42  )-----------( 279      ( 08 Feb 2023 07:19 )             44.0                         13.0   4.65  )-----------( 217      ( 07 Feb 2023 02:34 )             38.1     09 Feb 2023 06:49    143    |  108    |  16     ----------------------------<  84     4.6     |  30     |  1.10   08 Feb 2023 07:19    143    |  109    |  16     ----------------------------<  86     4.4     |  27     |  1.00   07 Feb 2023 06:29    142    |  108    |  15     ----------------------------<  89     4.2     |  28     |  0.90     Ca    9.2        09 Feb 2023 06:49  Ca    9.3        08 Feb 2023 07:19  Ca    8.9        07 Feb 2023 06:29    TPro  6.1    /  Alb  3.0    /  TBili  0.6    /  DBili  x      /  AST  28     /  ALT  31     /  AlkPhos  91     07 Feb 2023 06:29  TPro  6.5    /  Alb  3.2    /  TBili  0.6    /  DBili  x      /  AST  31     /  ALT  35     /  AlkPhos  105    06 Feb 2023 16:30     PTT - ( 09 Feb 2023 06:49 )  PTT:37.0 secTroponin I, High Sensitivity Result: 28.8 ng/L (02-06-23 @ 16:30)  D-Dimer Assay, Quantitative: 3234 ng/mL DDU (02-06-23 @ 16:30)    12 Lead ECG:   Ventricular Rate 51 BPM    Atrial Rate 51 BPM    P-R Interval 174 ms    QRS Duration 90 ms    Q-T Interval 438 ms    QTC Calculation(Bazett) 403 ms    P Axis 8 degrees    R Axis -10 degrees    T Axis 1 degrees    Diagnosis Line Sinus bradycardia with sinus arrhythmia  Possible Anterior infarct , age undetermined  Abnormal ECG  No previous ECGs available  Confirmed by Balaji Linda MD ([id number]) on 2/7/2023 2:51:37 PM (02-06-23 @ 17:17)      ACC: [id number] EXAM:  ECHO TTE WO CON COMP W DOPP   ORDERED BY: LIZBETH CUADRA     PROCEDURE DATE:  02/07/2023          INTERPRETATION:  INDICATION: Abnormal EKG  Sonographer KL    Blood Pressure 125/75    Height 180 cm     Weight 81.6 kg       BSA2.01   sq m    Dimensions:  LA 3.6       Normal Values: 2.0 - 4.0 cm  Ao 3.4        Normal Values: 2.0 - 3.8 cm  SEPTUM 1.0       Normal Values: 0.6 - 1.2 cm  PWT 0.9       Normal Values: 0.6 - 1.1 cm  LVIDd 5.5         Normal Values: 3.0 - 5.6 cm  LVIDs 3.4         Normal Values: 1.8 - 4.0 cm      OBSERVATIONS:  Mitral Valve: Mild mitral valve prolapse of the posterior leaflet, mild   MR.  Aortic Valve/Aorta: Sclerotic trileaflet aortic valve.  Tricuspid Valve: normal with trace TR.  Pulmonic Valve: Trace PI  Left Atrium: normal  Right Atrium: Not well-visualized  Left Ventricle: normal LV size and systolic function, estimated LVEF of   55-60%.  Right Ventricle: Grossly normal size and systolic function.  Pericardium: no significant pericardial effusion.        IMPRESSION:  Normal left ventricular internal dimensions and systolic function,   estimated LVEF of 55-60%.  Grossly normal RV size and systolic function.  Sclerotic trileaflet aortic valve, without AI.  Mild mitral valve prolapse of the posterior leaflet with mild MR  Trace TR.  No significant pericardial effusion.  --- End of Report ---  DOLLY BISWAS MD; Attending Cardiologist  This document has been electronically signed. Feb 8 2023  5:16PM  
[facility] -- Alexei Menendez Pannella, Patel, Savella, Goodger: Office # [phone]    Follow Up:  PE, DVT    Subjective/Observations: Patient awake, alert, resting in bed. Denies chest pain, palpitations and dizziness. Denies any difficulty breathing. No orthopnea and PND. Tolerating room air. Continuing on Heparin gtt.     REVIEW OF SYSTEMS: All other review of systems are negative unless indicated above    PAST MEDICAL & SURGICAL HISTORY:  No pertinent past medical history      [place] teeth extracted          MEDICATIONS  (STANDING):  heparin  Infusion.  Unit(s)/Hr (15 mL/Hr) IV Continuous <Continuous>    MEDICATIONS  (PRN):  acetaminophen     Tablet .. 650 milliGRAM(s) Oral every 6 hours PRN Temp greater or equal to 38C (100.4F), Mild Pain (1 - 3)  aluminum hydroxide/magnesium hydroxide/simethicone Suspension 30 milliLiter(s) Oral every 4 hours PRN Dyspepsia  heparin   Injectable 6500 Unit(s) IV Push every 6 hours PRN For aPTT less than 40  heparin   Injectable 3000 Unit(s) IV Push every 6 hours PRN For aPTT between 40 - 57  melatonin 3 milliGRAM(s) Oral at bedtime PRN Insomnia  ondansetron Injectable 4 milliGRAM(s) IV Push every 8 hours PRN Nausea and/or Vomiting    Allergies    No Known Allergies    Intolerances      Vital Signs Last 24 Hrs  T(C): 36.4 (08 Feb 2023 05:01), Max: 36.6 (07 Feb 2023 12:26)  T(F): 97.6 (08 Feb 2023 05:01), Max: 97.9 (07 Feb 2023 12:26)  HR: 45 (08 Feb 2023 05:01) (45 - 58)  BP: 142/77 (08 Feb 2023 05:01) (100/60 - 142/77)  BP(mean): --  RR: 18 (08 Feb 2023 05:01) (18 - 18)  SpO2: 95% (08 Feb 2023 05:01) (95% - 96%)    Parameters below as of 08 Feb 2023 05:01  Patient On (Oxygen Delivery Method): room air      I&O's Summary    07 Feb 2023 07:01  -  08 Feb 2023 07:00  --------------------------------------------------------  IN: 828 mL / OUT: 300 mL / NET: 528 mL          TELE: SB 40s, nonsustained 60ss 3 beats NSVT  PHYSICAL EXAM:  Constitutional: NAD, awake and alert  HEENT: Moist Mucous Membranes, Anicteric  Pulmonary: Non-labored, breath sounds are clear bilaterally, No wheezing, rales or rhonchi  Cardiovascular: Regular, S1 and S2, No murmurs, No rubs, gallops or clicks  Gastrointestinal:  soft, nontender, nondistended   Lymph: No peripheral edema. No lymphadenopathy.   Skin: No visible rashes or ulcers.  Psych:  Mood & affect appropriate      LABS: All Labs Reviewed:                        14.7   5.42  )-----------( 279      ( 08 Feb 2023 07:19 )             44.0                         13.0   4.65  )-----------( 217      ( 07 Feb 2023 02:34 )             38.1                         13.4   5.17  )-----------( 232      ( 06 Feb 2023 16:30 )             40.6     08 Feb 2023 07:19    143    |  109    |  16     ----------------------------<  86     4.4     |  27     |  1.00   07 Feb 2023 06:29    142    |  108    |  15     ----------------------------<  89     4.2     |  28     |  0.90   06 Feb 2023 16:30    141    |  109    |  15     ----------------------------<  87     4.3     |  26     |  0.92     Ca    9.3        08 Feb 2023 07:19  Ca    8.9        07 Feb 2023 06:29  Ca    8.7        06 Feb 2023 16:30    TPro  6.1    /  Alb  3.0    /  TBili  0.6    /  DBili  x      /  AST  28     /  ALT  31     /  AlkPhos  91     07 Feb 2023 06:29  TPro  6.5    /  Alb  3.2    /  TBili  0.6    /  DBili  x      /  AST  31     /  ALT  35     /  AlkPhos  105    06 Feb 2023 16:30   LIVER FUNCTIONS - ( 07 Feb 2023 06:29 )  Alb: 3.0 g/dL / Pro: 6.1 g/dL / ALK PHOS: 91 U/L / ALT: 31 U/L / AST: 28 U/L / GGT: x           PT/INR - ( 06 Feb 2023 16:30 )   PT: 12.0 sec;   INR: 1.03 ratio         PTT - ( 08 Feb 2023 07:19 )  PTT:65.4 secTroponin I, High Sensitivity Result: 28.8 ng/L (02-06-23 @ 16:30)  D-Dimer Assay, Quantitative: 3234 ng/mL DDU (02-06-23 @ 16:30)    12 Lead ECG:   Ventricular Rate 51 BPM    Atrial Rate 51 BPM    P-R Interval 174 ms    QRS Duration 90 ms    Q-T Interval 438 ms    QTC Calculation(Bazett) 403 ms    P Axis 8 degrees    R Axis -10 degrees    T Axis 1 degrees    Diagnosis Line Sinus bradycardia with sinus arrhythmia  Possible Anterior infarct , age undetermined  Abnormal ECG  No previous ECGs available  Confirmed by Balaji Linda MD ([id number]) on 2/7/2023 2:51:37 PM (02-06-23 @ 17:17)    
Patient is a 72y old  Male who presents with a chief complaint of PE (07 Feb 2023 16:28)  Pt without c/o pain or weakness  Swelling is markedly improved    MEDICATIONS  (STANDING):  heparin  Infusion.  Unit(s)/Hr (15 mL/Hr) IV Continuous <Continuous>    MEDICATIONS  (PRN):  acetaminophen     Tablet .. 650 milliGRAM(s) Oral every 6 hours PRN Temp greater or equal to 38C (100.4F), Mild Pain (1 - 3)  aluminum hydroxide/magnesium hydroxide/simethicone Suspension 30 milliLiter(s) Oral every 4 hours PRN Dyspepsia  heparin   Injectable 6500 Unit(s) IV Push every 6 hours PRN For aPTT less than 40  heparin   Injectable 3000 Unit(s) IV Push every 6 hours PRN For aPTT between 40 - 57  melatonin 3 milliGRAM(s) Oral at bedtime PRN Insomnia  ondansetron Injectable 4 milliGRAM(s) IV Push every 8 hours PRN Nausea and/or Vomiting    Allergies  No Known Allergies      Vital Signs Last 24 Hrs  T(C): 36.4 (08 Feb 2023 05:01), Max: 36.6 (07 Feb 2023 12:26)  T(F): 97.6 (08 Feb 2023 05:01), Max: 97.9 (07 Feb 2023 12:26)  HR: 45 (08 Feb 2023 05:01) (45 - 58)  BP: 142/77 (08 Feb 2023 05:01) (100/60 - 142/77)  BP(mean): --  RR: 18 (08 Feb 2023 05:01) (18 - 18)  SpO2: 95% (08 Feb 2023 05:01) (95% - 96%)    Parameters below as of 08 Feb 2023 05:01  Patient On (Oxygen Delivery Method): room air      I&O's Detail    07 Feb 2023 07:01  -  08 Feb 2023 07:00  --------------------------------------------------------  IN:    Heparin Infusion: 288 mL    Oral Fluid: 540 mL  Total IN: 828 mL    OUT:    Voided (mL): 300 mL  Total OUT: 300 mL    Total NET: 528 mL      PHYSICAL EXAM:  Constitutional: WD, WN M in NAD  Respiratory: CTA  Cardiovascular: normal S1, S2  Gastrointestinal: soft, ND, NT, no pulsatile masses  Extremities: Trace LLE edema without discoloration or skin breakdown; neg Gabby's  Neurological: A&O x3, JULIEN X 4 =  Pulses:   Right:                                                                          Left:  FEM [x ]2+ [ ]1+ [ ]doppler                                             FEM [x ]2+ [ ]1+ [ ]doppler    POP [x ]2+ [ ]1+ [ ]doppler                                             POP [x ]2+ [ ]1+ [ ]doppler    DP [x ]2+ [ ]1+ [ ]doppler                                                DP [x ]2+ [ ]1+ [ ]doppler  PT[x ]2+ [ ]1+ [ ]doppler                                                  PT [x ]2+ [ ]1+ [ ]doppler      LABS:                        13.0   4.65  )-----------( 217      ( 07 Feb 2023 02:34 )             38.1     02-07    142  |  108  |  15  ----------------------------<  89  4.2   |  28  |  0.90    Ca    8.9      07 Feb 2023 06:29    TPro  6.1  /  Alb  3.0<L>  /  TBili  0.6  /  DBili  x   /  AST  28  /  ALT  31  /  AlkPhos  91  02-07    PT/INR - ( 06 Feb 2023 16:30 )   PT: 12.0 sec;   INR: 1.03 ratio         PTT - ( 07 Feb 2023 18:20 )  PTT:73.4 sec  CAPILLARY BLOOD GLUCOSE        Radiology and Additional Studies:    < from: CT Abdomen and Pelvis w/ IV Cont (02.07.23 @ 14:54) >  ACC: [id number] EXAM:  CT ABDOMEN AND PELVIS IC   ORDERED BY: ÁNGEL FIERRO     PROCEDURE DATE:  02/07/2023          INTERPRETATION:  CLINICAL INFORMATION: Left lower extremity swelling and   shortness of breath.   Patient recently diagnosed with left lower extremity DVT and pulmonary   embolism.    COMPARISON: CT angiogram chest 2/6/2023 and Doppler venous sonogram   2/6/2023.    CONTRAST/COMPLICATIONS:  IV Contrast: Omnipaque 350  90 cc administered   10 cc discarded  Oral Contrast: NONE  Complications: None reported at time of study completion    PROCEDURE:  CT venogram of the Abdomen and Pelvis was performed.  Sagittal and coronal reformats were performed.    FINDINGS:    LOWER CHEST:   Filling defects within bilateral lower lobe segmental pulmonary arterial   branches compatible with known pulmonary emboli.    LIVER: Within normal limits.  BILE DUCTS: Normal caliber.  GALLBLADDER: Within normal limits.  SPLEEN: Within normal limits.  PANCREAS: Within normal limits.  ADRENALS: Within normal limits.  KIDNEYS/URETERS:  Small, indeterminate hypodense lesions left kidney.  No hydronephrosis.    BLADDER: Within normal limits.  REPRODUCTIVE ORGANS: Prostate is enlarged.    BOWEL:  No bowel obstruction.  Sigmoid diverticulosis, without CT evidence of diverticulitis.  PERITONEUM: No ascites.    VESSELS:  There are atherosclerotic changes abdominal aorta and bilateral common   iliac arteries.  There is no evidence of compression of the left common iliac vein at the   level of the right common iliac artery.  No filling defect is noted within the left common iliac vein of the left   external iliac vein.  The IVC is patent.    RETROPERITONEUM/LYMPH NODES: No lymphadenopathy.    ABDOMINAL WALL:  Small bilateral fat-containing inguinal hernias.    BONES:  Degenerative changes.    IMPRESSION:    No evidence of compression or thrombosis involving the left common iliac   or external iliac veins.    Other findings as discussed above.    < end of copied text >    
Patient is a 72y old  Male who presents with a chief complaint of PE (07 Feb 2023 13:09)      INTERVAL HPI/OVERNIGHT EVENTS:  No acute events overnight. Patient examined at bedside this AM in NAD. Pt stated that he felt fine. No shortness of breath or chest pain. Denied any fevers, chills, lightheadedness, abdominal pain.      MEDICATIONS  (STANDING):  heparin  Infusion.  Unit(s)/Hr (15 mL/Hr) IV Continuous <Continuous>    MEDICATIONS  (PRN):  acetaminophen     Tablet .. 650 milliGRAM(s) Oral every 6 hours PRN Temp greater or equal to 38C (100.4F), Mild Pain (1 - 3)  aluminum hydroxide/magnesium hydroxide/simethicone Suspension 30 milliLiter(s) Oral every 4 hours PRN Dyspepsia  heparin   Injectable 6500 Unit(s) IV Push every 6 hours PRN For aPTT less than 40  heparin   Injectable 3000 Unit(s) IV Push every 6 hours PRN For aPTT between 40 - 57  melatonin 3 milliGRAM(s) Oral at bedtime PRN Insomnia  ondansetron Injectable 4 milliGRAM(s) IV Push every 8 hours PRN Nausea and/or Vomiting      Allergies    No Known Allergies    Intolerances      REVIEW OF SYSTEMS:  CONSTITUTIONAL: No fever or chills  HEENT: No headache, sore throat  RESPIRATORY: No cough, wheezing, or shortness of breath  CARDIOVASCULAR: No chest pain, palpitations  GASTROINTESTINAL: No abd pain, nausea, vomiting, or diarrhea  GENITOURINARY: No dysuria, frequency, or hematuria  NEUROLOGICAL: No focal weakness or dizziness  MUSCULOSKELETAL: No myalgias      Vital Signs Last 24 Hrs  T(C): 36.6 (07 Feb 2023 12:26), Max: 37 (06 Feb 2023 22:53)  T(F): 97.9 (07 Feb 2023 12:26), Max: 98.6 (06 Feb 2023 22:53)  HR: 58 (07 Feb 2023 12:26) (46 - 60)  BP: 123/72 (07 Feb 2023 12:26) (123/72 - 159/78)  BP(mean): --  RR: 18 (07 Feb 2023 12:26) (14 - 18)  SpO2: 96% (07 Feb 2023 12:26) (94% - 98%)    Parameters below as of 07 Feb 2023 12:26  Patient On (Oxygen Delivery Method): room air      PHYSICAL EXAM:  GENERAL: NAD, appears stated age  HEENT: anicteric, eomi  CHEST/LUNG: CTA b/l, no rales, wheezes, or rhonchi  HEART: RRR, S1, S2  ABDOMEN: BS+, soft, nontender, nondistended  EXTREMITIES: no edema, cyanosis, or calf tenderness  NERVOUS SYSTEM: answers questions and follows commands appropriately      LABS:                        13.0   4.65  )-----------( 217      ( 07 Feb 2023 02:34 )             38.1     CBC Full  -  ( 07 Feb 2023 02:34 )  WBC Count : 4.65 K/uL  Hemoglobin : 13.0 g/dL  Hematocrit : 38.1 %  Platelet Count - Automated : 217 K/uL  Mean Cell Volume : 92.7 fl  Mean Cell Hemoglobin : 31.6 pg  Mean Cell Hemoglobin Concentration : 34.1 gm/dL  Auto Neutrophil # : x  Auto Lymphocyte # : x  Auto Monocyte # : x  Auto Eosinophil # : x  Auto Basophil # : x  Auto Neutrophil % : x  Auto Lymphocyte % : x  Auto Monocyte % : x  Auto Eosinophil % : x  Auto Basophil % : x    07 Feb 2023 06:29    142    |  108    |  15     ----------------------------<  89     4.2     |  28     |  0.90     Ca    8.9        07 Feb 2023 06:29    TPro  6.1    /  Alb  3.0    /  TBili  0.6    /  DBili  x      /  AST  28     /  ALT  31     /  AlkPhos  91     07 Feb 2023 06:29    PT/INR - ( 06 Feb 2023 16:30 )   PT: 12.0 sec;   INR: 1.03 ratio         PTT - ( 07 Feb 2023 11:07 )  PTT:70.7 sec    CAPILLARY BLOOD GLUCOSE      RADIOLOGY & ADDITIONAL TESTS:      Consultant(s) Notes Reviewed:  [x] YES  [ ] NO

## 2023-08-22 NOTE — ED ADULT NURSE NOTE - [id number]_ED_ALL_ED
[de-identified] : R elbow: mild swelling Tender lateral epicondyle Pain with ROM Pain with forced wrist extension  Xrays neg
Universal Safety Interventions